# Patient Record
Sex: MALE | Race: WHITE | Employment: OTHER | URBAN - METROPOLITAN AREA
[De-identification: names, ages, dates, MRNs, and addresses within clinical notes are randomized per-mention and may not be internally consistent; named-entity substitution may affect disease eponyms.]

---

## 2017-01-27 ENCOUNTER — TELEPHONE (OUTPATIENT)
Dept: NEUROLOGY | Facility: CLINIC | Age: 62
End: 2017-01-27

## 2017-01-30 ENCOUNTER — TELEPHONE (OUTPATIENT)
Dept: NEUROLOGY | Facility: CLINIC | Age: 62
End: 2017-01-30

## 2017-01-30 ENCOUNTER — OFFICE VISIT (OUTPATIENT)
Dept: NEUROLOGY | Facility: CLINIC | Age: 62
End: 2017-01-30

## 2017-01-30 VITALS
SYSTOLIC BLOOD PRESSURE: 144 MMHG | WEIGHT: 228 LBS | HEART RATE: 80 BPM | BODY MASS INDEX: 32.64 KG/M2 | RESPIRATION RATE: 16 BRPM | HEIGHT: 70 IN | DIASTOLIC BLOOD PRESSURE: 80 MMHG

## 2017-01-30 DIAGNOSIS — S16.1XXA CERVICAL STRAIN, INITIAL ENCOUNTER: Primary | ICD-10-CM

## 2017-01-30 DIAGNOSIS — M54.12 CERVICAL RADICULOPATHY: ICD-10-CM

## 2017-01-30 PROCEDURE — 99204 OFFICE O/P NEW MOD 45 MIN: CPT | Performed by: OTHER

## 2017-01-30 RX ORDER — CYCLOBENZAPRINE HCL 10 MG
TABLET ORAL
Refills: 0 | COMMUNITY
Start: 2017-01-13 | End: 2021-09-15

## 2017-01-30 RX ORDER — HYDROCODONE BITARTRATE AND ACETAMINOPHEN 7.5; 325 MG/1; MG/1
TABLET ORAL
COMMUNITY
End: 2017-01-30 | Stop reason: DRUGHIGH

## 2017-01-30 RX ORDER — CLOPIDOGREL BISULFATE 75 MG/1
TABLET ORAL
COMMUNITY
Start: 2011-10-18 | End: 2017-01-30

## 2017-01-30 RX ORDER — LISINOPRIL 5 MG/1
TABLET ORAL
Refills: 1 | COMMUNITY
Start: 2016-11-25 | End: 2021-09-15

## 2017-01-30 RX ORDER — AMLODIPINE BESYLATE 5 MG/1
TABLET ORAL
Refills: 1 | COMMUNITY
Start: 2016-11-25 | End: 2021-09-15

## 2017-01-30 NOTE — TELEPHONE ENCOUNTER
Pt. informed insurance was verified and physical therapy is a covered benefit and does not require authorization. Can proceed with scheduling appt.
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
## 2017-02-17 ENCOUNTER — LAB ENCOUNTER (OUTPATIENT)
Dept: LAB | Facility: HOSPITAL | Age: 62
End: 2017-02-17
Attending: INTERNAL MEDICINE
Payer: MEDICARE

## 2017-02-17 DIAGNOSIS — I25.10 CAD (CORONARY ARTERY DISEASE): Primary | ICD-10-CM

## 2017-02-17 LAB
ANION GAP SERPL CALC-SCNC: 9 MMOL/L (ref 0–18)
BUN SERPL-MCNC: 12 MG/DL (ref 8–20)
BUN/CREAT SERPL: 17.4 (ref 10–20)
CALCIUM SERPL-MCNC: 9.2 MG/DL (ref 8.5–10.5)
CHLORIDE SERPL-SCNC: 101 MMOL/L (ref 95–110)
CO2 SERPL-SCNC: 27 MMOL/L (ref 22–32)
CREAT SERPL-MCNC: 0.69 MG/DL (ref 0.5–1.5)
GLUCOSE SERPL-MCNC: 116 MG/DL (ref 70–99)
OSMOLALITY UR CALC.SUM OF ELEC: 285 MOSM/KG (ref 275–295)
POTASSIUM SERPL-SCNC: 4.2 MMOL/L (ref 3.3–5.1)
SODIUM SERPL-SCNC: 137 MMOL/L (ref 136–144)

## 2017-02-17 PROCEDURE — 80048 BASIC METABOLIC PNL TOTAL CA: CPT

## 2017-02-17 PROCEDURE — 36415 COLL VENOUS BLD VENIPUNCTURE: CPT

## 2017-03-07 ENCOUNTER — OFFICE VISIT (OUTPATIENT)
Dept: NEUROLOGY | Facility: CLINIC | Age: 62
End: 2017-03-07

## 2017-03-07 VITALS
SYSTOLIC BLOOD PRESSURE: 136 MMHG | RESPIRATION RATE: 16 BRPM | DIASTOLIC BLOOD PRESSURE: 86 MMHG | WEIGHT: 225 LBS | HEART RATE: 84 BPM | BODY MASS INDEX: 32 KG/M2

## 2017-03-07 DIAGNOSIS — M54.12 CERVICAL RADICULOPATHY: Primary | ICD-10-CM

## 2017-03-07 PROCEDURE — 99214 OFFICE O/P EST MOD 30 MIN: CPT | Performed by: OTHER

## 2017-03-07 RX ORDER — ACETAMINOPHEN AND CODEINE PHOSPHATE 300; 30 MG/1; MG/1
TABLET ORAL
Refills: 0 | COMMUNITY
Start: 2017-02-10 | End: 2017-03-07

## 2017-03-07 RX ORDER — METHYLPREDNISOLONE 4 MG/1
TABLET ORAL
Qty: 1 PACKAGE | Refills: 0 | Status: ON HOLD | OUTPATIENT
Start: 2017-03-07 | End: 2021-01-03

## 2017-03-07 NOTE — PROGRESS NOTES
He relates that he completed the physical therapy with about 30% improvement. He is receiving trigger point injections. He scheduled for an EMG test of the right upper extremity.   Old right wrist injury and many year history of right carpal tunnel sympto

## 2017-03-08 ENCOUNTER — MED REC SCAN ONLY (OUTPATIENT)
Dept: NEUROLOGY | Facility: CLINIC | Age: 62
End: 2017-03-08

## 2017-03-29 ENCOUNTER — LAB ENCOUNTER (OUTPATIENT)
Dept: LAB | Facility: HOSPITAL | Age: 62
End: 2017-03-29
Attending: INTERNAL MEDICINE
Payer: MEDICARE

## 2017-03-29 DIAGNOSIS — I10 HYPERTENSION: Primary | ICD-10-CM

## 2017-03-29 LAB
ANION GAP SERPL CALC-SCNC: 9 MMOL/L (ref 0–18)
BUN SERPL-MCNC: 13 MG/DL (ref 8–20)
BUN/CREAT SERPL: 18.6 (ref 10–20)
CALCIUM SERPL-MCNC: 9.6 MG/DL (ref 8.5–10.5)
CHLORIDE SERPL-SCNC: 100 MMOL/L (ref 95–110)
CO2 SERPL-SCNC: 28 MMOL/L (ref 22–32)
CREAT SERPL-MCNC: 0.7 MG/DL (ref 0.5–1.5)
GLUCOSE SERPL-MCNC: 119 MG/DL (ref 70–99)
OSMOLALITY UR CALC.SUM OF ELEC: 285 MOSM/KG (ref 275–295)
POTASSIUM SERPL-SCNC: 5 MMOL/L (ref 3.3–5.1)
SODIUM SERPL-SCNC: 137 MMOL/L (ref 136–144)

## 2017-03-29 PROCEDURE — 36415 COLL VENOUS BLD VENIPUNCTURE: CPT

## 2017-03-29 PROCEDURE — 80048 BASIC METABOLIC PNL TOTAL CA: CPT

## 2021-01-01 ENCOUNTER — APPOINTMENT (OUTPATIENT)
Dept: GENERAL RADIOLOGY | Facility: HOSPITAL | Age: 66
DRG: 193 | End: 2021-01-01
Attending: EMERGENCY MEDICINE
Payer: MEDICARE

## 2021-01-01 ENCOUNTER — HOSPITAL ENCOUNTER (INPATIENT)
Facility: HOSPITAL | Age: 66
LOS: 2 days | Discharge: HOME | DRG: 193 | End: 2021-01-03
Attending: EMERGENCY MEDICINE | Admitting: HOSPITALIST
Payer: MEDICARE

## 2021-01-01 DIAGNOSIS — J18.9 COMMUNITY ACQUIRED PNEUMONIA OF RIGHT LUNG, UNSPECIFIED PART OF LUNG: Primary | ICD-10-CM

## 2021-01-01 PROBLEM — E87.3 METABOLIC ALKALOSIS: Status: ACTIVE | Noted: 2021-01-01

## 2021-01-01 PROBLEM — R73.9 HYPERGLYCEMIA: Status: ACTIVE | Noted: 2021-01-01

## 2021-01-01 LAB
ANION GAP SERPL CALC-SCNC: 2 MMOL/L (ref 0–18)
BACTERIA UR QL AUTO: NEGATIVE /HPF
BASE EXCESS BLD CALC-SCNC: 4.4 MMOL/L (ref ?–2)
BASOPHILS # BLD AUTO: 0.08 X10(3) UL (ref 0–0.2)
BASOPHILS NFR BLD AUTO: 0.7 %
BILIRUB UR QL: NEGATIVE
BUN BLD-MCNC: 13 MG/DL (ref 7–18)
BUN/CREAT SERPL: 15.3 (ref 10–20)
CALCIUM BLD-MCNC: 9.4 MG/DL (ref 8.5–10.1)
CHLORIDE SERPL-SCNC: 101 MMOL/L (ref 98–112)
CLARITY UR: CLEAR
CO2 SERPL-SCNC: 33 MMOL/L (ref 21–32)
COLOR UR: YELLOW
CREAT BLD-MCNC: 0.85 MG/DL
D DIMER PPP FEU-MCNC: 0.4 UG/ML FEU (ref ?–0.66)
DEPRECATED RDW RBC AUTO: 47.2 FL (ref 35.1–46.3)
EOSINOPHIL # BLD AUTO: 0.19 X10(3) UL (ref 0–0.7)
EOSINOPHIL NFR BLD AUTO: 1.8 %
ERYTHROCYTE [DISTWIDTH] IN BLOOD BY AUTOMATED COUNT: 12.6 % (ref 11–15)
GLUCOSE BLD-MCNC: 121 MG/DL (ref 70–99)
GLUCOSE UR-MCNC: NEGATIVE MG/DL
HCO3 BLDA-SCNC: 28.3 MEQ/L (ref 21–27)
HCT VFR BLD AUTO: 44.2 %
HGB BLD-MCNC: 13.9 G/DL
HGB UR QL STRIP.AUTO: NEGATIVE
HYALINE CASTS #/AREA URNS AUTO: 4 /LPF
IMM GRANULOCYTES # BLD AUTO: 0.06 X10(3) UL (ref 0–1)
IMM GRANULOCYTES NFR BLD: 0.6 %
KETONES UR-MCNC: NEGATIVE MG/DL
LEUKOCYTE ESTERASE UR QL STRIP.AUTO: NEGATIVE
LYMPHOCYTES # BLD AUTO: 2.21 X10(3) UL (ref 1–4)
LYMPHOCYTES NFR BLD AUTO: 20.5 %
MCH RBC QN AUTO: 32 PG (ref 26–34)
MCHC RBC AUTO-ENTMCNC: 31.4 G/DL (ref 31–37)
MCV RBC AUTO: 101.6 FL
MODIFIED ALLEN TEST: POSITIVE
MONOCYTES # BLD AUTO: 0.89 X10(3) UL (ref 0.1–1)
MONOCYTES NFR BLD AUTO: 8.3 %
NEUTROPHILS # BLD AUTO: 7.33 X10 (3) UL (ref 1.5–7.7)
NEUTROPHILS # BLD AUTO: 7.33 X10(3) UL (ref 1.5–7.7)
NEUTROPHILS NFR BLD AUTO: 68.1 %
NITRITE UR QL STRIP.AUTO: NEGATIVE
NT-PROBNP SERPL-MCNC: 622 PG/ML (ref ?–125)
O2 CT BLD-SCNC: 18.4 VOL% (ref 15–23)
O2/TOTAL GAS SETTING VFR VENT: 28 %
OSMOLALITY SERPL CALC.SUM OF ELEC: 283 MOSM/KG (ref 275–295)
OXYGEN LITERS/MINUTE: 2 L/MIN
PCO2 BLDA: 69 MM HG (ref 35–45)
PH BLDA: 7.29 [PH] (ref 7.35–7.45)
PH UR: 5 [PH] (ref 5–8)
PLATELET # BLD AUTO: 185 10(3)UL (ref 150–450)
PO2 BLDA: 102 MM HG (ref 80–100)
POTASSIUM SERPL-SCNC: 4.6 MMOL/L (ref 3.5–5.1)
PROCALCITONIN SERPL-MCNC: 0.03 NG/ML (ref ?–0.16)
PROT UR-MCNC: 30 MG/DL
PUNCTURE CHARGE: YES
RBC # BLD AUTO: 4.35 X10(6)UL
RBC #/AREA URNS AUTO: <1 /HPF
SAO2 % BLDA: >99 % (ref 94–100)
SARS-COV-2 RNA RESP QL NAA+PROBE: NOT DETECTED
SODIUM SERPL-SCNC: 136 MMOL/L (ref 136–145)
SP GR UR STRIP: 1.03 (ref 1–1.03)
TROPONIN I SERPL-MCNC: <0.045 NG/ML (ref ?–0.04)
UROBILINOGEN UR STRIP-ACNC: <2
WBC # BLD AUTO: 10.8 X10(3) UL (ref 4–11)
WBC #/AREA URNS AUTO: <1 /HPF

## 2021-01-01 PROCEDURE — 71045 X-RAY EXAM CHEST 1 VIEW: CPT | Performed by: EMERGENCY MEDICINE

## 2021-01-01 PROCEDURE — 99223 1ST HOSP IP/OBS HIGH 75: CPT | Performed by: HOSPITALIST

## 2021-01-01 RX ORDER — ISOSORBIDE MONONITRATE 60 MG/1
60 TABLET, EXTENDED RELEASE ORAL DAILY
COMMUNITY
End: 2021-09-15

## 2021-01-02 PROCEDURE — 99233 SBSQ HOSP IP/OBS HIGH 50: CPT | Performed by: HOSPITALIST

## 2021-01-02 RX ORDER — CLOPIDOGREL BISULFATE 75 MG/1
75 TABLET ORAL DAILY
Status: DISCONTINUED | OUTPATIENT
Start: 2021-01-02 | End: 2021-01-03

## 2021-01-02 RX ORDER — METHYLPREDNISOLONE SODIUM SUCCINATE 40 MG/ML
40 INJECTION, POWDER, LYOPHILIZED, FOR SOLUTION INTRAMUSCULAR; INTRAVENOUS EVERY 8 HOURS
Status: DISCONTINUED | OUTPATIENT
Start: 2021-01-02 | End: 2021-01-02

## 2021-01-02 RX ORDER — AZITHROMYCIN 250 MG/1
500 TABLET, FILM COATED ORAL NIGHTLY
Status: DISCONTINUED | OUTPATIENT
Start: 2021-01-02 | End: 2021-01-03

## 2021-01-02 RX ORDER — LISINOPRIL 5 MG/1
5 TABLET ORAL DAILY
Status: DISCONTINUED | OUTPATIENT
Start: 2021-01-02 | End: 2021-01-02

## 2021-01-02 RX ORDER — ZOLPIDEM TARTRATE 5 MG/1
5 TABLET ORAL NIGHTLY PRN
Status: DISCONTINUED | OUTPATIENT
Start: 2021-01-02 | End: 2021-01-03

## 2021-01-02 RX ORDER — ASPIRIN 81 MG/1
81 TABLET, CHEWABLE ORAL DAILY
Status: DISCONTINUED | OUTPATIENT
Start: 2021-01-02 | End: 2021-01-03

## 2021-01-02 RX ORDER — LABETALOL HYDROCHLORIDE 5 MG/ML
10 INJECTION, SOLUTION INTRAVENOUS EVERY 4 HOURS PRN
Status: DISCONTINUED | OUTPATIENT
Start: 2021-01-02 | End: 2021-01-03

## 2021-01-02 RX ORDER — AMLODIPINE BESYLATE 5 MG/1
5 TABLET ORAL DAILY
Status: DISCONTINUED | OUTPATIENT
Start: 2021-01-02 | End: 2021-01-03

## 2021-01-02 RX ORDER — METOPROLOL SUCCINATE 50 MG/1
50 TABLET, EXTENDED RELEASE ORAL 2 TIMES DAILY
Status: DISCONTINUED | OUTPATIENT
Start: 2021-01-02 | End: 2021-01-03

## 2021-01-02 RX ORDER — ATORVASTATIN CALCIUM 40 MG/1
40 TABLET, FILM COATED ORAL NIGHTLY
Status: DISCONTINUED | OUTPATIENT
Start: 2021-01-02 | End: 2021-01-03

## 2021-01-02 RX ORDER — CODEINE PHOSPHATE AND GUAIFENESIN 10; 100 MG/5ML; MG/5ML
5 SOLUTION ORAL EVERY 4 HOURS PRN
Status: DISCONTINUED | OUTPATIENT
Start: 2021-01-02 | End: 2021-01-03

## 2021-01-02 RX ORDER — ALBUTEROL SULFATE 90 UG/1
2 AEROSOL, METERED RESPIRATORY (INHALATION) EVERY 6 HOURS PRN
Status: DISCONTINUED | OUTPATIENT
Start: 2021-01-02 | End: 2021-01-03

## 2021-01-02 RX ORDER — ENALAPRILAT 2.5 MG/2ML
1.25 INJECTION INTRAVENOUS EVERY 6 HOURS PRN
Status: DISCONTINUED | OUTPATIENT
Start: 2021-01-02 | End: 2021-01-03

## 2021-01-02 RX ORDER — ONDANSETRON 2 MG/ML
4 INJECTION INTRAMUSCULAR; INTRAVENOUS EVERY 6 HOURS PRN
Status: DISCONTINUED | OUTPATIENT
Start: 2021-01-02 | End: 2021-01-03

## 2021-01-02 RX ORDER — ISOSORBIDE MONONITRATE 60 MG/1
60 TABLET, EXTENDED RELEASE ORAL DAILY
Status: DISCONTINUED | OUTPATIENT
Start: 2021-01-02 | End: 2021-01-03

## 2021-01-02 RX ORDER — PANTOPRAZOLE SODIUM 40 MG/1
40 TABLET, DELAYED RELEASE ORAL
Status: DISCONTINUED | OUTPATIENT
Start: 2021-01-02 | End: 2021-01-03

## 2021-01-02 RX ORDER — LISINOPRIL 20 MG/1
20 TABLET ORAL DAILY
Status: DISCONTINUED | OUTPATIENT
Start: 2021-01-03 | End: 2021-01-03

## 2021-01-02 RX ORDER — ACETAMINOPHEN 325 MG/1
650 TABLET ORAL EVERY 6 HOURS PRN
Status: DISCONTINUED | OUTPATIENT
Start: 2021-01-02 | End: 2021-01-03

## 2021-01-02 RX ORDER — HEPARIN SODIUM 5000 [USP'U]/ML
5000 INJECTION, SOLUTION INTRAVENOUS; SUBCUTANEOUS EVERY 12 HOURS SCHEDULED
Status: DISCONTINUED | OUTPATIENT
Start: 2021-01-02 | End: 2021-01-03

## 2021-01-02 RX ORDER — LORATADINE 10 MG/1
10 TABLET ORAL DAILY
COMMUNITY
End: 2021-09-15

## 2021-01-02 RX ORDER — IPRATROPIUM BROMIDE AND ALBUTEROL SULFATE 2.5; .5 MG/3ML; MG/3ML
3 SOLUTION RESPIRATORY (INHALATION)
Status: DISCONTINUED | OUTPATIENT
Start: 2021-01-02 | End: 2021-01-03

## 2021-01-02 NOTE — ED NOTES
Pt's wife states she believes pt is having reaction to new medication Isosorbide that was started a week ago.

## 2021-01-02 NOTE — ED NOTES
Orders for admission, patient is aware of plan and ready to go upstairs. Any questions, please call ED KEM Saucedo  at extension [phone].    Type of COVID test sent:Rapid  COVID Suspicion level: Low    Titratable drug(s) infusing:Azithromycin  Rate:250ml/hr
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
## 2021-01-02 NOTE — H&P
Rekha Silva [id number] Patient Status:  Emergency    1955 MRN [id number]   Location [address] Attending Ritta Dakins, MD   [facility] Day # 0 PCP Yang Joseph MD     Da Patient Reported? Taking? Albuterol Sulfate  (90 BASE) MCG/ACT Inhalation Aero Soln Taking  Yes Yes   Sig: Inhale into the lungs every 6 (six) hours as needed for Wheezing.    AmLODIPine Besylate 5 MG Oral Tab Not Taking  Yes No   Sig: TK 1 T PO  Q Normocephalic, oral mucosa is moist.  Head:  Normocephalic, atraumatic. Neck:  Supple, non-tender, no carotid bruit, no jugular venous distention, no lymphadenopathy, no thyromegaly.   Respiratory: Occasional wheeze, poor air entry, respirations are mildly every 8 hours. Acute respiratory acidosis  Secondary to COPD exacerbation, currently on BiPAP will monitor clinically.     Acute encephalopathy  Likely secondary to hypercarbia, showing signs of improvement with BiPAP in place we will continue to monitor

## 2021-01-02 NOTE — PLAN OF CARE
Problem: Patient Centered Care  Goal: Patient preferences are identified and integrated in the patient's plan of care  Description: Interventions:  - What would you like us to know as we care for you?  PT FROM HOME WITH WIFE- Provide timely, complete, and and behaviors that affect risk of falls.   - [place] fall precautions as indicated by assessment.  - Educate pt/family on patient safety including physical limitations  - Instruct pt to call for assistance with activity based on assessment  - Modify envir weights  Outcome: Progressing  Goal: Absence of cardiac arrhythmias or at baseline  Description: INTERVENTIONS:  - Continuous cardiac monitoring, monitor vital signs, obtain 12 lead EKG if indicated  - Evaluate effectiveness of antiarrhythmic and heart rat

## 2021-01-02 NOTE — ED NOTES
Pt A&Ox3, reg resp, nard. Pt reports sob x2 days, reports cp states it is chronic. Pt reports pmhx cabg, htn and hld.  Pt 84% on roomair, initiated on 2 L NC. Pt's wife at bedside, reports pt has been confused since this am, states \"hes talking to himself\

## 2021-01-02 NOTE — PROGRESS NOTES
[facility]    Progress Note    Gladis Mcnulty Sr. Patient Status:  Inpatient    1955 MRN [id number]   Location [facility] 5SW/SE Attending Kirit Reeves MD   Hosp Day # 1 PCP No primary care provider on file.        Subjective: Nebulization Q6H [address]   • MethylPREDNISolone Sodium Succ  40 mg Intravenous Q8H   • azithromycin  500 mg Oral Nightly   • Pantoprazole Sodium  40 mg Oral QAM AC   • Fluticasone Furoate-Vilanterol  1 puff Inhalation Daily       Current PRN Inpatient Meds: changes have occurred Electronically signed on 01/01/2021 at 22:17 by Saige Form Assessment and Plan:   Vale Arzola is a 70yo male w/ hx sig for CAD s/p CABG 2012, HTN, COPD, and LINDA noncompliant with CPAP, who p/w dyspnea for 2 days.  Patient zeynep

## 2021-01-02 NOTE — ED PROVIDER NOTES
Patient Seen in: [facility] AND [facility] Emergency Department      History   Patient presents with:  Shortness Of Breath    Stated Complaint: sob    HPI/Subjective:   HPI  Patient is a 48-year-old male history of CAD status post CABG, MI, hypertension, hyperli Current:/78   Pulse 93   Temp 98.1 °F (36.7 °C) (Oral)   Resp 16   Ht 177.8 cm (5' 10\")   Wt 95.3 kg   SpO2 96%   BMI 30.13 kg/m²         Physical Exam  Vitals signs and nursing note reviewed.    Constitutional:       General: He is not in acut following components:    Protein Urine 30  (*)     All other components within normal limits   ARTERIAL BLOOD GAS - Abnormal; Notable for the following components:    ABG pH 7.29 (*)     ABG pCO2 69 (*)     ABG PO2 102 (*)     ABG HCO3 28.3 (*)     Blood G no acute distress. Vital signs with tachypnea and hypoxia. Oxygen saturation 84% on room air. Patient placed on 2 L nasal cannula with improvement in oxygen saturation. Exam with conversational dyspnea, lungs clear without wheezing.   No increased work

## 2021-01-03 VITALS
SYSTOLIC BLOOD PRESSURE: 139 MMHG | OXYGEN SATURATION: 93 % | HEIGHT: 70 IN | TEMPERATURE: 98 F | HEART RATE: 86 BPM | BODY MASS INDEX: 30.06 KG/M2 | DIASTOLIC BLOOD PRESSURE: 92 MMHG | RESPIRATION RATE: 20 BRPM | WEIGHT: 210 LBS

## 2021-01-03 PROBLEM — R73.9 HYPERGLYCEMIA: Status: RESOLVED | Noted: 2021-01-01 | Resolved: 2021-01-03

## 2021-01-03 PROBLEM — J18.9 COMMUNITY ACQUIRED PNEUMONIA OF RIGHT LUNG, UNSPECIFIED PART OF LUNG: Status: RESOLVED | Noted: 2021-01-01 | Resolved: 2021-01-03

## 2021-01-03 PROBLEM — J18.9 COMMUNITY ACQUIRED PNEUMONIA OF RIGHT LUNG: Status: RESOLVED | Noted: 2021-01-01 | Resolved: 2021-01-03

## 2021-01-03 PROBLEM — E87.3 METABOLIC ALKALOSIS: Status: RESOLVED | Noted: 2021-01-01 | Resolved: 2021-01-03

## 2021-01-03 LAB
ANION GAP SERPL CALC-SCNC: 1 MMOL/L (ref 0–18)
BUN BLD-MCNC: 10 MG/DL (ref 7–18)
BUN/CREAT SERPL: 13.7 (ref 10–20)
CALCIUM BLD-MCNC: 9.4 MG/DL (ref 8.5–10.1)
CHLORIDE SERPL-SCNC: 102 MMOL/L (ref 98–112)
CO2 SERPL-SCNC: 37 MMOL/L (ref 21–32)
CREAT BLD-MCNC: 0.73 MG/DL
DEPRECATED RDW RBC AUTO: 46.4 FL (ref 35.1–46.3)
ERYTHROCYTE [DISTWIDTH] IN BLOOD BY AUTOMATED COUNT: 12.4 % (ref 11–15)
GLUCOSE BLD-MCNC: 121 MG/DL (ref 70–99)
HCT VFR BLD AUTO: 39.9 %
HGB BLD-MCNC: 12.8 G/DL
MCH RBC QN AUTO: 32.2 PG (ref 26–34)
MCHC RBC AUTO-ENTMCNC: 32.1 G/DL (ref 31–37)
MCV RBC AUTO: 100.5 FL
OSMOLALITY SERPL CALC.SUM OF ELEC: 290 MOSM/KG (ref 275–295)
PLATELET # BLD AUTO: 159 10(3)UL (ref 150–450)
POTASSIUM SERPL-SCNC: 3.8 MMOL/L (ref 3.5–5.1)
RBC # BLD AUTO: 3.97 X10(6)UL
SODIUM SERPL-SCNC: 140 MMOL/L (ref 136–145)
WBC # BLD AUTO: 7.9 X10(3) UL (ref 4–11)

## 2021-01-03 PROCEDURE — 99239 HOSP IP/OBS DSCHRG MGMT >30: CPT | Performed by: HOSPITALIST

## 2021-01-03 RX ORDER — IPRATROPIUM BROMIDE AND ALBUTEROL SULFATE 2.5; .5 MG/3ML; MG/3ML
3 SOLUTION RESPIRATORY (INHALATION) EVERY 6 HOURS PRN
Qty: 300 ML | Refills: 0 | Status: SHIPPED | OUTPATIENT
Start: 2021-01-03

## 2021-01-03 RX ORDER — CEFUROXIME AXETIL 500 MG/1
500 TABLET ORAL 2 TIMES DAILY
Qty: 6 TABLET | Refills: 0 | Status: SHIPPED | OUTPATIENT
Start: 2021-01-03 | End: 2021-01-06

## 2021-01-03 RX ORDER — POTASSIUM CHLORIDE 20 MEQ/1
40 TABLET, EXTENDED RELEASE ORAL ONCE
Status: COMPLETED | OUTPATIENT
Start: 2021-01-03 | End: 2021-01-03

## 2021-01-03 RX ORDER — AZITHROMYCIN 250 MG/1
250 TABLET, FILM COATED ORAL DAILY
Qty: 3 TABLET | Refills: 0 | Status: SHIPPED | OUTPATIENT
Start: 2021-01-03 | End: 2021-01-06

## 2021-01-03 NOTE — PROGRESS NOTES
Discharge summary: Went over discharge instructions with patient and answered all questions. IV taken off. Tele discontinued and hub notified. Patient has all belongings. Paper scripts for meds given to patient. Waiting for his wife to pick him up.

## 2021-01-03 NOTE — DISCHARGE SUMMARY
[facility]    Discharge Summary    Yobany García Sr. Patient Status:  Inpatient    1955 MRN [id number]   Location [facility] 5SW/SE Attending Yary Chowdhury MD   Hosp Day # 2 PCP No primary care provider on file.      Date of Ad discharge.     # Metabolic encephalopathy d/t hypercapnia  - resolved     # Community acquired pneumonia of right lung, possbile  - CXR on admission shows subtle patchy opacification in the right lung  - Ceftriaxone and azithromycin (1/1-1/2) -> cefuroxime 01/03/2021    HCT 39.9 01/03/2021    .0 01/03/2021    CREATSERUM 0.73 01/03/2021    BUN 10 01/03/2021     01/03/2021    K 3.8 01/03/2021     01/03/2021    CO2 37.0 (H) 01/03/2021     (H) 01/03/2021    CA 9.4 01/03/2021    ALB 3.8 Tabs  Commonly known as: NORVASC      TK 1 T PO  QD   Refills: 1     aspirin 81 MG Tabs      Take 81 mg by mouth daily. Refills: 0     atorvastatin 40 MG Tabs  Commonly known as: LIPITOR      Take 40 mg by mouth nightly.    Refills: 0     Clopidogrel Bisu

## 2021-01-03 NOTE — PLAN OF CARE
Pt alert and oriented, placed on cont pulse ox overnight, maintains room air and continues to refuse cpap/bipap. Tylenol given for pain. Pt sleeping soundly throughout the night. Voiding freely to urinal. Denies dizziness or nausea.  Abx of PO azithromycin and evaluate response  - Implement non-pharmacological measures as appropriate and evaluate response  - Consider cultural and social influences on pain and pain management  - Manage/alleviate anxiety  - Utilize distraction and/or relaxation techniques  - M support system  Outcome: Progressing     Problem: CARDIOVASCULAR - ADULT  Goal: Maintains optimal cardiac output and hemodynamic stability  Description: INTERVENTIONS:  - Monitor vital signs, rhythm, and trends  - Monitor for bleeding, hypotension and sign

## 2021-08-28 ENCOUNTER — APPOINTMENT (OUTPATIENT)
Dept: CT IMAGING | Facility: HOSPITAL | Age: 66
DRG: 097 | End: 2021-08-28
Payer: MEDICARE

## 2021-08-28 ENCOUNTER — APPOINTMENT (OUTPATIENT)
Dept: GENERAL RADIOLOGY | Facility: HOSPITAL | Age: 66
DRG: 097 | End: 2021-08-28
Payer: MEDICARE

## 2021-08-28 ENCOUNTER — HOSPITAL ENCOUNTER (INPATIENT)
Facility: HOSPITAL | Age: 66
LOS: 18 days | Discharge: NURSING, REHAB OR LONG-TERM CARE | DRG: 097 | End: 2021-09-15
Admitting: INTERNAL MEDICINE
Payer: MEDICARE

## 2021-08-28 DIAGNOSIS — F03.90 RAPIDLY PROGRESSIVE DEMENTIA (HCC): ICD-10-CM

## 2021-08-28 DIAGNOSIS — J96.22 ACUTE ON CHRONIC RESPIRATORY FAILURE WITH HYPERCAPNIA (HCC): Primary | ICD-10-CM

## 2021-08-28 DIAGNOSIS — G93.41 METABOLIC ENCEPHALOPATHY: ICD-10-CM

## 2021-08-28 DIAGNOSIS — R41.0 DELIRIUM, ACUTE: ICD-10-CM

## 2021-08-28 DIAGNOSIS — R27.8 OTHER LACK OF COORDINATION: ICD-10-CM

## 2021-08-28 PROBLEM — E87.2 RESPIRATORY ACIDOSIS: Status: ACTIVE | Noted: 2021-08-28

## 2021-08-28 LAB
AMPHET UR QL SCN: NEGATIVE
ANION GAP SERPL CALC-SCNC: 0 MMOL/L (ref 0–18)
BARBITURATES UR QL SCN: NEGATIVE
BASE EXCESS BLD CALC-SCNC: 8 MMOL/L (ref ?–2)
BASOPHILS # BLD AUTO: 0.06 X10(3) UL (ref 0–0.2)
BASOPHILS NFR BLD AUTO: 0.7 %
BILIRUB UR QL: NEGATIVE
BUN BLD-MCNC: 16 MG/DL (ref 7–18)
BUN/CREAT SERPL: 18.4 (ref 10–20)
CALCIUM BLD-MCNC: 9.8 MG/DL (ref 8.5–10.1)
CANNABINOIDS UR QL SCN: NEGATIVE
CHLORIDE SERPL-SCNC: 104 MMOL/L (ref 98–112)
CO2 SERPL-SCNC: 37 MMOL/L (ref 21–32)
COCAINE UR QL: NEGATIVE
COLOR UR: YELLOW
CREAT BLD-MCNC: 0.87 MG/DL
CREAT UR-SCNC: 512 MG/DL
DEPRECATED RDW RBC AUTO: 48.7 FL (ref 35.1–46.3)
EOSINOPHIL # BLD AUTO: 0.2 X10(3) UL (ref 0–0.7)
EOSINOPHIL NFR BLD AUTO: 2.3 %
ERYTHROCYTE [DISTWIDTH] IN BLOOD BY AUTOMATED COUNT: 13 % (ref 11–15)
GLUCOSE BLD-MCNC: 108 MG/DL (ref 70–99)
GLUCOSE BLDC GLUCOMTR-MCNC: 118 MG/DL (ref 70–99)
GLUCOSE UR-MCNC: NEGATIVE MG/DL
HCO3 BLDA-SCNC: 31.1 MEQ/L (ref 21–27)
HCT VFR BLD AUTO: 47.1 %
HGB BLD-MCNC: 14.9 G/DL
HGB UR QL STRIP.AUTO: NEGATIVE
IMM GRANULOCYTES # BLD AUTO: 0.05 X10(3) UL (ref 0–1)
IMM GRANULOCYTES NFR BLD: 0.6 %
KETONES UR-MCNC: NEGATIVE MG/DL
LEUKOCYTE ESTERASE UR QL STRIP.AUTO: NEGATIVE
LYMPHOCYTES # BLD AUTO: 2.47 X10(3) UL (ref 1–4)
LYMPHOCYTES NFR BLD AUTO: 28.6 %
MCH RBC QN AUTO: 32.3 PG (ref 26–34)
MCHC RBC AUTO-ENTMCNC: 31.6 G/DL (ref 31–37)
MCV RBC AUTO: 102.2 FL
MDMA UR QL SCN: NEGATIVE
METHADONE UR QL SCN: NEGATIVE
MODIFIED ALLEN TEST: POSITIVE
MONOCYTES # BLD AUTO: 1.21 X10(3) UL (ref 0.1–1)
MONOCYTES NFR BLD AUTO: 14 %
NEUTROPHILS # BLD AUTO: 4.66 X10 (3) UL (ref 1.5–7.7)
NEUTROPHILS # BLD AUTO: 4.66 X10(3) UL (ref 1.5–7.7)
NEUTROPHILS NFR BLD AUTO: 53.8 %
NITRITE UR QL STRIP.AUTO: NEGATIVE
O2 CT BLD-SCNC: 20.7 VOL% (ref 15–23)
OSMOLALITY SERPL CALC.SUM OF ELEC: 294 MOSM/KG (ref 275–295)
OXYCODONE UR QL SCN: NEGATIVE
OXYGEN LITERS/MINUTE: 3 L/MIN
PCO2 BLDA: 90 MM HG (ref 35–45)
PCP UR QL SCN: NEGATIVE
PH BLDA: 7.25 [PH] (ref 7.35–7.45)
PH UR: 5 [PH] (ref 5–8)
PLATELET # BLD AUTO: 180 10(3)UL (ref 150–450)
PO2 BLDA: 111 MM HG (ref 80–100)
POTASSIUM SERPL-SCNC: 4.2 MMOL/L (ref 3.5–5.1)
PROT UR-MCNC: 100 MG/DL
PUNCTURE CHARGE: YES
RBC # BLD AUTO: 4.61 X10(6)UL
SAO2 % BLDA: 98.9 % (ref 94–100)
SARS-COV-2 RNA RESP QL NAA+PROBE: NOT DETECTED
SODIUM SERPL-SCNC: 141 MMOL/L (ref 136–145)
SP GR UR STRIP: >1.03 (ref 1–1.03)
TROPONIN I SERPL-MCNC: <0.045 NG/ML (ref ?–0.04)
UROBILINOGEN UR STRIP-ACNC: <2
WBC # BLD AUTO: 8.7 X10(3) UL (ref 4–11)

## 2021-08-28 PROCEDURE — 71045 X-RAY EXAM CHEST 1 VIEW: CPT

## 2021-08-28 PROCEDURE — 99291 CRITICAL CARE FIRST HOUR: CPT | Performed by: INTERNAL MEDICINE

## 2021-08-28 PROCEDURE — 70450 CT HEAD/BRAIN W/O DYE: CPT

## 2021-08-28 RX ORDER — IPRATROPIUM BROMIDE AND ALBUTEROL SULFATE 2.5; .5 MG/3ML; MG/3ML
3 SOLUTION RESPIRATORY (INHALATION)
Status: DISCONTINUED | OUTPATIENT
Start: 2021-08-28 | End: 2021-08-30

## 2021-08-28 RX ORDER — LISINOPRIL 5 MG/1
5 TABLET ORAL DAILY
Status: DISCONTINUED | OUTPATIENT
Start: 2021-08-29 | End: 2021-09-08

## 2021-08-28 RX ORDER — HEPARIN SODIUM 5000 [USP'U]/ML
5000 INJECTION, SOLUTION INTRAVENOUS; SUBCUTANEOUS EVERY 12 HOURS SCHEDULED
Status: DISCONTINUED | OUTPATIENT
Start: 2021-08-28 | End: 2021-09-15

## 2021-08-28 RX ORDER — ISOSORBIDE MONONITRATE 60 MG/1
60 TABLET, EXTENDED RELEASE ORAL DAILY
Status: DISCONTINUED | OUTPATIENT
Start: 2021-08-29 | End: 2021-09-15

## 2021-08-28 RX ORDER — METOPROLOL SUCCINATE 50 MG/1
50 TABLET, EXTENDED RELEASE ORAL 2 TIMES DAILY
Status: DISCONTINUED | OUTPATIENT
Start: 2021-08-28 | End: 2021-09-15

## 2021-08-28 RX ORDER — SODIUM CHLORIDE 9 MG/ML
INJECTION, SOLUTION INTRAVENOUS CONTINUOUS
Status: DISCONTINUED | OUTPATIENT
Start: 2021-08-28 | End: 2021-08-30

## 2021-08-28 RX ORDER — CLOPIDOGREL BISULFATE 75 MG/1
75 TABLET ORAL DAILY
Status: DISCONTINUED | OUTPATIENT
Start: 2021-08-29 | End: 2021-09-15

## 2021-08-28 RX ORDER — ASPIRIN 81 MG/1
81 TABLET, CHEWABLE ORAL DAILY
Status: DISCONTINUED | OUTPATIENT
Start: 2021-08-29 | End: 2021-09-15

## 2021-08-28 RX ORDER — IPRATROPIUM BROMIDE AND ALBUTEROL SULFATE 2.5; .5 MG/3ML; MG/3ML
SOLUTION RESPIRATORY (INHALATION)
Status: DISPENSED
Start: 2021-08-28 | End: 2021-08-29

## 2021-08-28 RX ORDER — ACETAMINOPHEN 10 MG/ML
1000 INJECTION, SOLUTION INTRAVENOUS ONCE
Status: COMPLETED | OUTPATIENT
Start: 2021-08-28 | End: 2021-08-28

## 2021-08-28 RX ORDER — ATORVASTATIN CALCIUM 40 MG/1
40 TABLET, FILM COATED ORAL NIGHTLY
Status: DISCONTINUED | OUTPATIENT
Start: 2021-08-29 | End: 2021-09-15

## 2021-08-28 RX ORDER — ACETAMINOPHEN 325 MG/1
650 TABLET ORAL EVERY 6 HOURS PRN
Status: DISCONTINUED | OUTPATIENT
Start: 2021-08-28 | End: 2021-09-13

## 2021-08-28 RX ORDER — AMLODIPINE BESYLATE 5 MG/1
5 TABLET ORAL DAILY
Status: DISCONTINUED | OUTPATIENT
Start: 2021-08-29 | End: 2021-09-08

## 2021-08-28 RX ORDER — METHYLPREDNISOLONE SODIUM SUCCINATE 40 MG/ML
40 INJECTION, POWDER, LYOPHILIZED, FOR SOLUTION INTRAMUSCULAR; INTRAVENOUS EVERY 6 HOURS
Status: DISCONTINUED | OUTPATIENT
Start: 2021-08-28 | End: 2021-08-30

## 2021-08-28 NOTE — ED QUICK NOTES
Patient presents with:  Altered Mental Status    Patient to ed via private vehicle by wife, per wife patient has been altered x 1 week. Hx of copd denies fever. Denies urinary sx. Patient arrives to ed extremely restless.  Blood drawn and sent to lab in

## 2021-08-28 NOTE — ED QUICK NOTES
Rounding completed    Awaiting lab/imaging results  Elimination assistance offered  No additional needs at this time  Call light within reach, will update patient with more information  Will continue to monitor

## 2021-08-28 NOTE — ED PROVIDER NOTES
Patient Seen in: [facility] AND [facility] Emergency Department      History   Patient presents with:  Altered Mental Status    Stated Complaint:     HPI/Subjective:   HPI    The patient is a 79-year-old male with a history of coronary artery disease, sleep apne All other systems reviewed and negative except as noted above. Physical Exam     ED Triage Vitals   BP 08/28/21 1312 128/67   Pulse 08/28/21 1312 63   Resp 08/28/21 1312 17   Temp 08/28/21 1312 96.8 °F (36 °C)   Temp src --    SpO2 08/28/21 1312 (! ) Cranial nerves are intact. Motor: Motor function is intact. Coordination: Coordination abnormal.      Deep Tendon Reflexes: Reflexes are normal and symmetric.       Comments: Confused, moving all extremities, intermittently follows commands, answe CBC W/ DIFFERENTIAL[[id number]]          Abnormal            Final result                 Please view results for these tests on the individual orders.    RAINBOW DRAW LAVENDER   RAINBOW DRAW LIGHT GREEN   RAINBOW DRAW GOLD     EKG    Rate, intervals and a Impression:  Acute on chronic respiratory failure with hypercapnia (HCC)  (primary encounter diagnosis)  Delirium, acute  Metabolic encephalopathy     Disposition:  Admit  8/28/2021  3:18 pm    Follow-up:  No follow-up provider specified.   We recommend guerline

## 2021-08-28 NOTE — ED INITIAL ASSESSMENT (HPI)
Pt presents via triage with wife who reports pt has been acting \"off\" since Monday. Wife states he has been falling asleep at the table, lethargic, weak to stand, c/o intermittent chest pain, confused and not answering questions appropriately.  Pt present

## 2021-08-28 NOTE — H&P
[facility]    History & Physical    Date:  8/28/2021   Date of Admission:  8/28/2021      Chief Complaint:   Duyen Bush Sr. is a(n) 78year old male with lethargy and hypercapnia.     HPI:   The patient has an extensive prior history of t Use      Smoking status: Former Smoker        Types: Cigarettes        Quit date: 2016        Years since quittin.0      Smokeless tobacco: Never Used    Alcohol use: Yes      Alcohol/week: 0.0 standard drinks      Comment: 6 cans beer per week. 111    Assessment/Plan:   1. Acute on chronic respiratory failure–the patient has multifactorial respiratory failure with COPD, obesity–hypoventilation and untreated obstructive sleep apnea constituting the overlap syndrome.   Also, he is taking narcotics

## 2021-08-29 LAB
ALBUMIN SERPL-MCNC: 3.5 G/DL (ref 3.4–5)
ALBUMIN/GLOB SERPL: 1 {RATIO} (ref 1–2)
ALP LIVER SERPL-CCNC: 59 U/L
ALT SERPL-CCNC: 20 U/L
ANION GAP SERPL CALC-SCNC: 0 MMOL/L (ref 0–18)
AST SERPL-CCNC: 14 U/L (ref 15–37)
BASE EXCESS BLD CALC-SCNC: 8.4 MMOL/L (ref ?–2)
BASOPHILS # BLD AUTO: 0.01 X10(3) UL (ref 0–0.2)
BASOPHILS NFR BLD AUTO: 0.2 %
BILIRUB SERPL-MCNC: 0.4 MG/DL (ref 0.1–2)
BUN BLD-MCNC: 14 MG/DL (ref 7–18)
BUN/CREAT SERPL: 25.9 (ref 10–20)
CALCIUM BLD-MCNC: 9 MG/DL (ref 8.5–10.1)
CHLORIDE SERPL-SCNC: 106 MMOL/L (ref 98–112)
CO2 SERPL-SCNC: 33 MMOL/L (ref 21–32)
CREAT BLD-MCNC: 0.54 MG/DL
DEPRECATED RDW RBC AUTO: 48.6 FL (ref 35.1–46.3)
EOSINOPHIL # BLD AUTO: 0 X10(3) UL (ref 0–0.7)
EOSINOPHIL NFR BLD AUTO: 0 %
ERYTHROCYTE [DISTWIDTH] IN BLOOD BY AUTOMATED COUNT: 12.9 % (ref 11–15)
GLOBULIN PLAS-MCNC: 3.5 G/DL (ref 2.8–4.4)
GLUCOSE BLD-MCNC: 138 MG/DL (ref 70–99)
HCO3 BLDA-SCNC: 31.5 MEQ/L (ref 21–27)
HCT VFR BLD AUTO: 44.4 %
HGB BLD-MCNC: 14.1 G/DL
IMM GRANULOCYTES # BLD AUTO: 0.02 X10(3) UL (ref 0–1)
IMM GRANULOCYTES NFR BLD: 0.4 %
LYMPHOCYTES # BLD AUTO: 0.67 X10(3) UL (ref 1–4)
LYMPHOCYTES NFR BLD AUTO: 14.8 %
M PROTEIN MFR SERPL ELPH: 7 G/DL (ref 6.4–8.2)
MCH RBC QN AUTO: 32.6 PG (ref 26–34)
MCHC RBC AUTO-ENTMCNC: 31.8 G/DL (ref 31–37)
MCV RBC AUTO: 102.8 FL
MODIFIED ALLEN TEST: POSITIVE
MONOCYTES # BLD AUTO: 0.06 X10(3) UL (ref 0.1–1)
MONOCYTES NFR BLD AUTO: 1.3 %
NEUTROPHILS # BLD AUTO: 3.77 X10 (3) UL (ref 1.5–7.7)
NEUTROPHILS # BLD AUTO: 3.77 X10(3) UL (ref 1.5–7.7)
NEUTROPHILS NFR BLD AUTO: 83.3 %
O2 CT BLD-SCNC: 18.9 VOL% (ref 15–23)
OSMOLALITY SERPL CALC.SUM OF ELEC: 291 MOSM/KG (ref 275–295)
OXYGEN LITERS/MINUTE: 2 L/MIN
PCO2 BLDA: 55 MM HG (ref 35–45)
PH BLDA: 7.41 [PH] (ref 7.35–7.45)
PLATELET # BLD AUTO: 160 10(3)UL (ref 150–450)
PO2 BLDA: 64 MM HG (ref 80–100)
POTASSIUM SERPL-SCNC: 4.3 MMOL/L (ref 3.5–5.1)
PUNCTURE CHARGE: YES
RBC # BLD AUTO: 4.32 X10(6)UL
SAO2 % BLDA: >99 % (ref 94–100)
SODIUM SERPL-SCNC: 139 MMOL/L (ref 136–145)
WBC # BLD AUTO: 4.5 X10(3) UL (ref 4–11)

## 2021-08-29 PROCEDURE — 99233 SBSQ HOSP IP/OBS HIGH 50: CPT | Performed by: INTERNAL MEDICINE

## 2021-08-29 RX ORDER — HYDROCODONE BITARTRATE AND ACETAMINOPHEN 5; 325 MG/1; MG/1
1 TABLET ORAL EVERY 6 HOURS PRN
Status: DISCONTINUED | OUTPATIENT
Start: 2021-08-29 | End: 2021-08-31

## 2021-08-29 NOTE — RESPIRATORY THERAPY NOTE
Received patient on cont Bipap 12/5 40%. Tolerating well. Saturating appropriately.  No changes at this time

## 2021-08-29 NOTE — PROGRESS NOTES
Received pt from ED at approx. 1645. On continuous BiPAP, restless and oriented to self only. Wife at bedside. Unable to complete med rec d/t wife unsure of home meds and pharmacy closed.  Notified MD.

## 2021-08-29 NOTE — PHYSICAL THERAPY NOTE
PHYSICAL THERAPY EVALUATION - INPATIENT     Room Number: 412/248-W  Evaluation Date: 8/29/2021  Type of Evaluation: Initial   Physician Order: PT Eval and Treat    Presenting Problem: respiratory failure   Reason for Therapy: Mobility Dysfunction and Disc of patient status post session. Patient will benefit from continued IP PT services to address these deficits in preparation for discharge.     DISCHARGE RECOMMENDATIONS  PT Discharge Recommendations: 24 hour care/supervision;Home with home health PT    MIREYA AND STRENGTH ASSESSMENT    Lower extremity ROM is within functional limits BLE WNL    Lower extremity strength is within functional limits BLE WNL    BALANCE  Static Sitting: Good  Dynamic Sitting: Fair +  Static Standing: Fair  Dynamic Standing: Fair    A independence with home activity/exercise instructions provided to patient in preparation for discharge.    Goal #5   Current Status    Goal #6    Goal #6  Current Status

## 2021-08-29 NOTE — PLAN OF CARE
Problem: Delirium  Goal: Minimize duration of delirium  Description: Interventions:  - Encourage use of hearing aids, eye glasses  - Promote highest level of mobility daily  - Provide frequent reorientation  - Promote wakefulness i.e. lights on, blinds o within normal limits  Description: INTERVENTIONS:  - Monitor labs and rhythm and assess patient for signs and symptoms of electrolyte imbalances  - Administer electrolyte replacement as ordered  - Monitor response to electrolyte replacements, including rhy

## 2021-08-29 NOTE — OCCUPATIONAL THERAPY NOTE
OCCUPATIONAL THERAPY EVALUATION - INPATIENT     Room Number: 076/255-C  Evaluation Date: 8/29/2021  Type of Evaluation: Initial  Presenting Problem:  (acute on chronic respiratory failure)    Physician Order: IP Consult to Occupational Therapy  Reason for conservation/work simplification techniques;ADL training;Functional transfer training; Endurance training;Patient/Family education;Patient/Family training;Equipment eval/education       OCCUPATIONAL THERAPY MEDICAL/SOCIAL HISTORY     Problem List  Principal functional limits    ACTIVITIES OF DAILY LIVING ASSESSMENT  AM-PAC ‘6-Clicks’ Inpatient Daily Activity Short Form  How much help from another person does the patient currently need…  -   Putting on and taking off regular lower body clothing?: A Little  -

## 2021-08-29 NOTE — PROGRESS NOTES
[facility]    Progress Note    Ruben Kaye Sr. Patient Status:  Inpatient    1955 MRN [id number]   Location [facility] 2W/SW Attending Zainab Max MD   Hosp Day # 1 PCP PHYSICIAN NONSTAFF     CC: Shortness of breath 102.2* 102.8*   MCH 32.3 32.6   MCHC 31.6 31.8   RDW 13.0 12.9   NEPRELIM 4.66 3.77   WBC 8.7 4.5   .0 160.0     Recent Labs   Lab 08/28/21  1328 08/29/21  0513   * 138*   BUN 16 14   CREATSERUM 0.87 0.54*   GFRAA 103 126   GFRNAA 89 109   CA and concern for possible LINDA.   -Patient also with history of heart failure with reduced ejection fraction, but without signs of hypervolemia.  -Continue duo nebs, Solu-Medrol  -BiPAP nightly  -Recheck blood gas to monitor hypercapnia  -Pulmonology on consu

## 2021-08-29 NOTE — PLAN OF CARE
Problem: Delirium  Goal: Minimize duration of delirium  Description: Interventions:  - Encourage use of hearing aids, eye glasses  - Promote highest level of mobility daily  - Provide frequent reorientation  - Promote wakefulness i.e. lights on, blinds o electrolyte replacements, including rhythm and repeat lab results as appropriate  - Fluid restriction as ordered  - Instruct patient on fluid and nutrition restrictions as appropriate  Outcome: Progressing     Problem: SKIN/TISSUE INTEGRITY - ADULT  Goal:

## 2021-08-29 NOTE — SLP NOTE
ADULT SWALLOWING EVALUATION    ASSESSMENT    ASSESSMENT/OVERALL IMPRESSION:  PPE: DROPLET MASK, GOWN AND GLOVES. HAND SANITIZED UPON ENTRANCE/EXIT. SLP BSSE orders received and acknowledged. A swallow evaluation warranted per RN dysphagia screening.  Pt Diet Recommendations - Solids: Regular  Diet Recommendations - Liquids: Thin Liquids    Compensatory Strategies Recommended: No straws; Slow rate; Alternate consistencies;Small bites and sips  Aspiration Precautions: Upright position; Slow rate;Small bites Functional Limits  Range of Motion: Within Functional Limits  Rate of Motion: Within Functional Limits    Voice Quality: Clear (minimal horseness)  Respiratory Status: Supplemental O2;Nasal cannula (2L via NC, 93%)  Consistencies Trialed: Thin liquids; Hard Angelia Schaeffer, SLP.     Meron Gonzáles, MS CCC-SLP/L  Speech Language Pathologist  EXT [phone]

## 2021-08-29 NOTE — PROGRESS NOTES
[facility]    Progress Note      Assessment and Plan:   1.   Acute on chronic respiratory failure–the patient has multifactorial respiratory failure with COPD, obesity–hypoventilation and untreated obstructive sleep apnea constituting the ov and no mass appreciable. Extremities without clubbing cyanosis nor edema. Trigger finger on right hand index finger. Neurologic grossly intact with symmetric tone and strength and reflex.   Skin without gross abnormality     Results:     Lab Results   C

## 2021-08-29 NOTE — PROGRESS NOTES
Dual RN skin check performed prior to transfer off the unit. Skin check performed by this RN and Corey Domingo RN. Wound are as follows: none. Will remain available for questions or concerns. Patient transferred to room 547. Report given to Collin.  Family at

## 2021-08-30 ENCOUNTER — APPOINTMENT (OUTPATIENT)
Dept: GENERAL RADIOLOGY | Facility: HOSPITAL | Age: 66
DRG: 097 | End: 2021-08-30
Attending: HOSPITALIST
Payer: MEDICARE

## 2021-08-30 PROBLEM — F05 DELIRIUM DUE TO MULTIPLE ETIOLOGIES: Status: ACTIVE | Noted: 2021-08-28

## 2021-08-30 PROBLEM — F39 EPISODIC MOOD DISORDER (HCC): Status: ACTIVE | Noted: 2021-08-30

## 2021-08-30 LAB — GLUCOSE BLDC GLUCOMTR-MCNC: 131 MG/DL (ref 70–99)

## 2021-08-30 PROCEDURE — 73560 X-RAY EXAM OF KNEE 1 OR 2: CPT | Performed by: HOSPITALIST

## 2021-08-30 PROCEDURE — 90792 PSYCH DIAG EVAL W/MED SRVCS: CPT | Performed by: OTHER

## 2021-08-30 PROCEDURE — 99233 SBSQ HOSP IP/OBS HIGH 50: CPT | Performed by: INTERNAL MEDICINE

## 2021-08-30 PROCEDURE — 99233 SBSQ HOSP IP/OBS HIGH 50: CPT | Performed by: HOSPITALIST

## 2021-08-30 RX ORDER — METHYLPREDNISOLONE SODIUM SUCCINATE 40 MG/ML
40 INJECTION, POWDER, LYOPHILIZED, FOR SOLUTION INTRAMUSCULAR; INTRAVENOUS EVERY 12 HOURS
Status: DISCONTINUED | OUTPATIENT
Start: 2021-08-30 | End: 2021-08-30

## 2021-08-30 RX ORDER — LAMOTRIGINE 25 MG/1
25 TABLET ORAL 2 TIMES DAILY
Status: DISCONTINUED | OUTPATIENT
Start: 2021-08-30 | End: 2021-09-01

## 2021-08-30 RX ORDER — METHYLPREDNISOLONE SODIUM SUCCINATE 40 MG/ML
40 INJECTION, POWDER, LYOPHILIZED, FOR SOLUTION INTRAMUSCULAR; INTRAVENOUS EVERY 24 HOURS
Status: DISCONTINUED | OUTPATIENT
Start: 2021-08-30 | End: 2021-08-31

## 2021-08-30 RX ORDER — IPRATROPIUM BROMIDE AND ALBUTEROL SULFATE 2.5; .5 MG/3ML; MG/3ML
3 SOLUTION RESPIRATORY (INHALATION) EVERY 6 HOURS PRN
Status: DISCONTINUED | OUTPATIENT
Start: 2021-08-30 | End: 2021-09-15

## 2021-08-30 RX ORDER — IPRATROPIUM BROMIDE AND ALBUTEROL SULFATE 2.5; .5 MG/3ML; MG/3ML
3 SOLUTION RESPIRATORY (INHALATION)
Status: DISCONTINUED | OUTPATIENT
Start: 2021-08-30 | End: 2021-09-03

## 2021-08-30 RX ORDER — THIAMINE HYDROCHLORIDE 100 MG/ML
100 INJECTION, SOLUTION INTRAMUSCULAR; INTRAVENOUS DAILY
Status: DISCONTINUED | OUTPATIENT
Start: 2021-08-30 | End: 2021-09-06

## 2021-08-30 RX ORDER — QUETIAPINE 25 MG/1
25 TABLET, FILM COATED ORAL NIGHTLY
Status: DISCONTINUED | OUTPATIENT
Start: 2021-08-30 | End: 2021-08-31

## 2021-08-30 RX ORDER — FOLIC ACID 1 MG/1
1 TABLET ORAL DAILY
Status: DISCONTINUED | OUTPATIENT
Start: 2021-08-30 | End: 2021-09-15

## 2021-08-30 NOTE — SLP NOTE
SPEECH DAILY NOTE - INPATIENT    ASSESSMENT & PLAN   ASSESSMENT  SLP f/u for ongoing meal assessment per recommendations of BSE 8/29/2021. RN reports pt tolerates diet and medication well with no overt clinical s/s aspiration.  Pt denies any swallowing chal REGULAR consistency and THIN liquids without overt signs or symptoms of aspiration with 100 % accuracy over 1-2 session(s).      Pt tolerated REGULAR/THIN diet with no CSA with 100% accuracy t/o session.    In Progress   Goal #2 The patient/family/caregiver

## 2021-08-30 NOTE — PLAN OF CARE
Had fall this morning. Hallucinating at times. Impulsive. Knee xrays done. On room air. Dr Doty Purchase on consult. Steroids decreased. Dr Isaiah Hannon saw pt.   Problem: Delirium  Goal: Minimize duration of delirium  Description: Interventions:  - Encourage use rhythm and assess patient for signs and symptoms of electrolyte imbalances  - Administer electrolyte replacement as ordered  - Monitor response to electrolyte replacements, including rhythm and repeat lab results as appropriate  - Fluid restriction as orde cultural backgrounds into the planning and delivery of care  - Encourage patient/family to participate in care and decision-making at the level they choose  - Honor patient and family perspectives and choices  Outcome: Progressing     Problem: Patient/Fami

## 2021-08-30 NOTE — PROGRESS NOTES
[facility]  Hospitalist Progress Note     Deisi Lo Sr. Patient Status:  Inpatient    1955  78year old [facility] [id number]   Location 54/[facility] Attending Crystal Luo MD   Hosp Day # 2 PCP PHYSICIAN NONSTAFF     Assessment & Plan: rashes, erythema, diaphoresis. Psych: Normal mood and affect. Calm, cooperative    Labs:  Recent Labs   Lab 08/28/21  1328 08/29/21  0513   RBC 4.61 4.32   HGB 14.9 14.1   HCT 47.1 44.4   .2* 102.8*   MCH 32.3 32.6   MCHC 31.6 31.8   RDW 13.0 12. 9

## 2021-08-30 NOTE — SIGNIFICANT EVENT
Significant Event - Fall Note    Date/Time of Fall: 08/30/24 at 21     Fall huddle completed: Yes    Description of patient fall:     Patient fell from: Standing     Activity when fall occurred: Ambulating without assistance or assistive devices     Where Ladonna Yoo RN           Was patient identified as high fall risk prior to fall:                  Score: 75             What interventions were in place prior to fall: Assistive devices (wheelchair, commode, walker, gait belt), Bed alarm, Bed in [facility] positio

## 2021-08-30 NOTE — PROGRESS NOTES
[facility]    Progress Note      Assessment and Plan:   1.   Acute on chronic respiratory failure–the patient has multifactorial respiratory failure with COPD, obesity–hypoventilation and untreated obstructive sleep apnea constituting the ov edema.  Trigger finger on right hand index finger. Neurologic grossly intact with symmetric tone and strength and reflex.   Skin without gross abnormality     Results:          Niko Prajapati MD  Medical Director, Critical Care, [facility]  Medical Director, E

## 2021-08-30 NOTE — BH PROGRESS NOTE
Psych Liaison provided outpatient therapy and psychiatry resources in Richardson's discharge instructions that are in network with [employer] and within 10 miles of his home    Salome BROWN, [address]

## 2021-08-30 NOTE — PLAN OF CARE
Problem: Delirium  Goal: Minimize duration of delirium  Description: Interventions:  - Encourage use of hearing aids, eye glasses  - Promote highest level of mobility daily  - Provide frequent reorientation  - Promote wakefulness i.e. lights on, blinds o Progressing     Problem: METABOLIC/FLUID AND ELECTROLYTES - ADULT  Goal: Electrolytes maintained within normal limits  Description: INTERVENTIONS:  - Monitor labs and rhythm and assess patient for signs and symptoms of electrolyte imbalances  - Administer PT/OT  - Encourage visually impaired, hearing impaired and aphasic patients to use assistive/communication devices  8/29/2021 1931 by Sarmad Heredia RN  Outcome: Progressing  8/29/2021 1930 by Sarmad Heredia RN  Outcome: Progressing     Problem:
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
## 2021-08-30 NOTE — PLAN OF CARE
Problem: Delirium  Goal: Minimize duration of delirium  Description: Interventions:  - Encourage use of hearing aids, eye glasses  - Promote highest level of mobility daily  - Provide frequent reorientation  - Promote wakefulness i.e. lights on, blinds o electrolyte replacements, including rhythm and repeat lab results as appropriate  - Fluid restriction as ordered  - Instruct patient on fluid and nutrition restrictions as appropriate  Outcome: Progressing     Problem: SKIN/TISSUE INTEGRITY - ADULT  Goal: choose  - Honor patient and family perspectives and choices  Outcome: Progressing     Problem: Patient/Family Goals  Goal: Patient/Family Long Term Goal  Description: Patient's Long Term Goal: Be discharged from hospital.    Interventions:  - Follow MD ord

## 2021-08-31 ENCOUNTER — APPOINTMENT (OUTPATIENT)
Dept: MRI IMAGING | Facility: HOSPITAL | Age: 66
DRG: 097 | End: 2021-08-31
Attending: Other
Payer: MEDICARE

## 2021-08-31 LAB
AMMONIA PLAS-MCNC: <10 UMOL/L (ref 11–32)
APTT PPP: 27.7 SECONDS (ref 23.2–35.3)
TSI SER-ACNC: 0.46 MIU/ML (ref 0.36–3.74)
VIT B12 SERPL-MCNC: 1297 PG/ML (ref 193–986)

## 2021-08-31 PROCEDURE — 99233 SBSQ HOSP IP/OBS HIGH 50: CPT | Performed by: OTHER

## 2021-08-31 PROCEDURE — 99233 SBSQ HOSP IP/OBS HIGH 50: CPT | Performed by: HOSPITALIST

## 2021-08-31 PROCEDURE — 99223 1ST HOSP IP/OBS HIGH 75: CPT | Performed by: OTHER

## 2021-08-31 PROCEDURE — 99232 SBSQ HOSP IP/OBS MODERATE 35: CPT | Performed by: INTERNAL MEDICINE

## 2021-08-31 PROCEDURE — 70551 MRI BRAIN STEM W/O DYE: CPT | Performed by: OTHER

## 2021-08-31 RX ORDER — DOXEPIN HYDROCHLORIDE 50 MG/1
1 CAPSULE ORAL DAILY
Status: DISCONTINUED | OUTPATIENT
Start: 2021-08-31 | End: 2021-09-15

## 2021-08-31 RX ORDER — QUETIAPINE 25 MG/1
50 TABLET, FILM COATED ORAL NIGHTLY
Status: DISCONTINUED | OUTPATIENT
Start: 2021-08-31 | End: 2021-09-03

## 2021-08-31 RX ORDER — HALOPERIDOL 5 MG/ML
1 INJECTION INTRAMUSCULAR EVERY 4 HOURS PRN
Status: DISCONTINUED | OUTPATIENT
Start: 2021-08-31 | End: 2021-09-08

## 2021-08-31 RX ORDER — PREDNISONE 20 MG/1
20 TABLET ORAL
Status: DISCONTINUED | OUTPATIENT
Start: 2021-08-31 | End: 2021-09-05

## 2021-08-31 NOTE — CONSULTS
Consult dictated. Wife provides most of the history. 2-week history of behavioral, cognitive changes, visual loose Nations. 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  Therefore, discussed with wife, need to obtain spinal

## 2021-08-31 NOTE — PROGRESS NOTES
[facility]  Hospitalist Progress Note     Srinivasan Mercado Sr. Patient Status:  Inpatient    1955  78year old CSN [id number]   [facility] Mary Grace Robertson, [facility] Day # 3 PCP PHYSICIAN NONSTAFF     Assessment & Plan: distress, wheezes, rales, rhonchi. Abd: Soft, NTND, BS normal, no mass, no HSM, no rebound/guarding. Neuro: Normal reflexes, CN. Sensory/motor exams grossly normal deficit. MS: No joint effusions. No peripheral edema. Skin: Skin is warm and dry.  No

## 2021-08-31 NOTE — CONSULTS
Physician Medicine & Rehabilitation Consult Note         SUBJECTIVE:    Chief Complaint: To assess rehabilitation needs following Mobility and ADL dysfunction s/p Acute on chronic respiratory failure with hypercapnia (HCC) as per request of . .     H FUNCTIONAL ADL ASSESSMENT  Grooming: CGA (standing at sink)   Feeding: n/t   Bathing: n/t   Toileting: n/t; did not need to go at this time   Upper Extremity Dressing: n/t-spouse wanted PCT to assist pt   Lower Extremity Dressing: min.  A         Past M Status:  HOME SITUATION  Type of Home: House   Home Layout: One level  Stairs to Enter : 5  Railing: Yes  Stairs to Bedroom: 0  Railing: No  Lives With: Spouse  Drives: Yes  Patient Owned Equipment: None  Patient Regularly Uses: None     Prior Level of Ind strength  Neuro: alert and oriented to self, hospital, situation, month not year, face grossly symmetrical  Finger-to-nose becomes increasingly impaired on right side's test continues, mild dysmetria on the left  Psych: Appropriate affect, cooperative    D insufficiency/failure  · Goal directed therapy with rehabilitation potential will be provided in the following domains.  Bed mobility or transfers , Cognitive, speech, language or swallowing retraining, Range of motion and strengthening and Ambulation  · Th

## 2021-08-31 NOTE — OCCUPATIONAL THERAPY NOTE
OCCUPATIONAL THERAPY TREATMENT NOTE - INPATIENT        Room Number: 547B/547-B           Presenting Problem:  (acute on chronic respiratory failure)    Problem List  Principal Problem:    Acute on chronic respiratory failure with hypercapnia (HCC)  Active spouse, pt continues to have hallucinations, however none present during therapy session today. Pt reporting no pain, however pt's spouse reports L calf pain. RN was notified. Pt did have a fall yesterday, landed on his knees.  Xray was neg for fx or disloc Modifier (G-Code): CK    FUNCTIONAL TRANSFER ASSESSMENT  Supine to Sit : Not tested (OOB at this time )  Sit to Stand: Minimum assistance  Toilet Transfer: n/t; did not need to go at this time   Shower Transfer: n/t   Chair Transfer: CGA w/RW     Joelle Moreira

## 2021-08-31 NOTE — PROGRESS NOTES
[facility]    Progress Note      Assessment and Plan:   1.   Acute on chronic respiratory failure–the patient has multifactorial respiratory failure with COPD, obesity–hypoventilation and untreated obstructive sleep apnea constituting the ov cyanosis nor edema. Trigger finger on right hand index finger. Neurologic grossly intact with symmetric tone and strength and reflex, although mild disorientation.   Skin without gross abnormality     Results:     Lab Results   Component Value Date    B12

## 2021-08-31 NOTE — OCCUPATIONAL THERAPY NOTE
RN cleared pt for OT session, however pt with psychiatry physician in extensive discussion. Will cont to follow.

## 2021-08-31 NOTE — PHYSICAL THERAPY NOTE
PHYSICAL THERAPY TREATMENT NOTE - INPATIENT     Room Number: 547B/547-B       Presenting Problem: respiratory failure     Problem List  Principal Problem:    Acute on chronic respiratory failure with hypercapnia (HCC)  Active Problems:    Respiratory acido due to LE fatigue. Coordination: FTN- Decreased speed with L UE. Patient was left in bedside chair, alarm activated and PCT present with pt  at end of session with all needs in reach.  The patient's Approx Degree of Impairment: 50.57% has been calcul etc.): A Little   -   Moving from lying on back to sitting on the side of the bed?: A Little   How much help from another person does the patient currently need. ..   -   Moving to and from a bed to a chair (including a wheelchair)?: A Little   -   Need to

## 2021-08-31 NOTE — CONSULTS
[facility]    Report of Consultation    Duyen Bush Sr. Patient Status:  Inpatient    1955 MRN [id number]   Location [facility] 5SW/SE Attending Payton Daugherty MD   Hosp Day # 2 PCP PHYSICIAN NONSTAFF     Date of Admissio also indicated that \"I knew it was not real\". The patient reported that nothing out of ordinary has happened prior to that other than he was distress physically with the move.   Patient denies any change in his drinking habit reporting that he drink av cannabis or illicit substance use and reports smoking half a pack of cigarettes daily until recently and is smoking a nicotine vape pen.       Medical History:       Past Medical History  Past Medical History:   Diagnosis Date   • Arthritis    • Atheroscler 5 mg, Oral, Daily  metoprolol succinate (Toprol XL) 24 hr tab 50 mg, 50 mg, Oral, BID  Heparin Sodium (Porcine) 5000 UNIT/ML injection 5,000 Units, 5,000 Units, Subcutaneous, 2 times per day  acetaminophen (TYLENOL) tab 650 mg, 650 mg, Oral, Q6H PRN      i 11/18/2016         Imaging:  XR KNEE (1 OR 2 VIEWS), LEFT (CPT=73560)    Result Date: 8/30/2021  CONCLUSION:  1. Osteoarthritis. 2. Chondrocalcinosis. 3. Old fracture proximal fibula. 4. Surgical clips medial soft tissues. 5. Joint effusion.  6. At the [facility] a robot  Intellect: Limited due to his delirium. Language: Appropriate in structure.   Insight/Judgment: somewhat limited/impaired d/t medical condition and sustained alcohol use    Impression:     Impression:        Delirium due to multiple etiologies (ch blood gas      Rapid SARS-CoV-2 by PCR      Clostridium difficile(toxigenic)PCR      Meds This Visit:  Requested Prescriptions      No prescriptions requested or ordered in this encounter           Valdo Bennett MD  8/30/2021

## 2021-09-01 LAB
ANION GAP SERPL CALC-SCNC: 6 MMOL/L (ref 0–18)
BASOPHILS # BLD AUTO: 0.02 X10(3) UL (ref 0–0.2)
BASOPHILS NFR BLD AUTO: 0.3 %
BUN BLD-MCNC: 17 MG/DL (ref 7–18)
BUN/CREAT SERPL: 30.4 (ref 10–20)
CALCIUM BLD-MCNC: 9.2 MG/DL (ref 8.5–10.1)
CHLORIDE SERPL-SCNC: 109 MMOL/L (ref 98–112)
CO2 SERPL-SCNC: 33 MMOL/L (ref 21–32)
CREAT BLD-MCNC: 0.56 MG/DL
DEPRECATED RDW RBC AUTO: 50.5 FL (ref 35.1–46.3)
EOSINOPHIL # BLD AUTO: 0.03 X10(3) UL (ref 0–0.7)
EOSINOPHIL NFR BLD AUTO: 0.4 %
ERYTHROCYTE [DISTWIDTH] IN BLOOD BY AUTOMATED COUNT: 13.7 % (ref 11–15)
GLUCOSE BLD-MCNC: 92 MG/DL (ref 70–99)
HCT VFR BLD AUTO: 42.6 %
HGB BLD-MCNC: 13.6 G/DL
IMM GRANULOCYTES # BLD AUTO: 0.03 X10(3) UL (ref 0–1)
IMM GRANULOCYTES NFR BLD: 0.4 %
LYMPHOCYTES # BLD AUTO: 2.3 X10(3) UL (ref 1–4)
LYMPHOCYTES NFR BLD AUTO: 30.5 %
MCH RBC QN AUTO: 32.2 PG (ref 26–34)
MCHC RBC AUTO-ENTMCNC: 31.9 G/DL (ref 31–37)
MCV RBC AUTO: 100.7 FL
MONOCYTES # BLD AUTO: 0.77 X10(3) UL (ref 0.1–1)
MONOCYTES NFR BLD AUTO: 10.2 %
NEUTROPHILS # BLD AUTO: 4.38 X10 (3) UL (ref 1.5–7.7)
NEUTROPHILS # BLD AUTO: 4.38 X10(3) UL (ref 1.5–7.7)
NEUTROPHILS NFR BLD AUTO: 58.2 %
OSMOLALITY SERPL CALC.SUM OF ELEC: 307 MOSM/KG (ref 275–295)
PLATELET # BLD AUTO: 161 10(3)UL (ref 150–450)
POTASSIUM SERPL-SCNC: 3.7 MMOL/L (ref 3.5–5.1)
RBC # BLD AUTO: 4.23 X10(6)UL
SODIUM SERPL-SCNC: 148 MMOL/L (ref 136–145)
WBC # BLD AUTO: 7.5 X10(3) UL (ref 4–11)

## 2021-09-01 PROCEDURE — 99232 SBSQ HOSP IP/OBS MODERATE 35: CPT | Performed by: OTHER

## 2021-09-01 PROCEDURE — 99233 SBSQ HOSP IP/OBS HIGH 50: CPT | Performed by: HOSPITALIST

## 2021-09-01 PROCEDURE — 99231 SBSQ HOSP IP/OBS SF/LOW 25: CPT | Performed by: INTERNAL MEDICINE

## 2021-09-01 RX ORDER — LAMOTRIGINE 25 MG/1
50 TABLET ORAL 2 TIMES DAILY
Status: DISCONTINUED | OUTPATIENT
Start: 2021-09-01 | End: 2021-09-08

## 2021-09-01 NOTE — PROGRESS NOTES
[facility]    Progress Note    Earnest Smith Sr. Patient Status:  Inpatient    1955 MRN [id number]   [facility] Attending Rashaun Hameed MD   Hosp Day # 4 PCP PHYSICIAN NONSTAFF       Subjective:   Antionette Catching (NORVASC) tab 5 mg, 5 mg, Oral, Daily  •  [Held by provider] aspirin chewable tab 81 mg, 81 mg, Oral, Daily  •  atorvastatin (LIPITOR) tab 40 mg, 40 mg, Oral, Nightly  •  [Held by provider] clopidogrel (PLAVIX) tab 75 mg, 75 mg, Oral, Daily  •  isosorbide 44.4 42.6   .2* 102.8* 100.7*   MCH 32.3 32.6 32.2   MCHC 31.6 31.8 31.9   RDW 13.0 12.9 13.7   NEPRELIM 4.66 3.77 4.38   WBC 8.7 4.5 7.5   .0 160.0 161.0         Recent Labs   Lab 08/28/21  1328 08/29/21  0513 09/01/21  0458   * 138*

## 2021-09-01 NOTE — PHYSICAL THERAPY NOTE
PHYSICAL THERAPY TREATMENT NOTE - INPATIENT     Room Number: 547B/547-B       Presenting Problem: respiratory failure     Problem List  Principal Problem:    Acute on chronic respiratory failure with hypercapnia (HCC)  Active Problems:    Respiratory acido training    SUBJECTIVE  Patient agreeable to therapy services    OBJECTIVE  Precautions: Bed/chair alarm    WEIGHT BEARING RESTRICTION  Weight Bearing Restriction: None  PAIN ASSESSMENT   Ratin    BALANCE Goal #4 Patient will negotiate 5 stairs/one curb w/ assistive device and supervision   Goal #4   Current Status  NT   Goal #5 Patient to demonstrate independence with home activity/exercise instructions provided to patient in preparation for discharge.

## 2021-09-01 NOTE — PROGRESS NOTES
Pulmonary/Critical Care Follow Up Note    HPI:   Kassandra Carver is a 78year old male with Patient presents with:  Altered Mental Status      PCP PHYSICIAN NONSTAFF  Admission Attending Shanta Martell MD    Hospital Day #4    Getting EEG  Following co dizzyness from bee stings. PHYSICAL EXAM:   Blood pressure 136/74, pulse 58, temperature 97.5 °F (36.4 °C), temperature source Oral, resp. rate 18, weight 206 lb 2.1 oz (93.5 kg), SpO2 93 %.     Intake/Output Summary (Last 24 hours) at 9/1/2021 154

## 2021-09-01 NOTE — PROGRESS NOTES
[facility]    Progress Note    Valiant Gosselin Sr. Patient Status:  Inpatient    1955 MRN [id number]   Location [facility] 5SW/SE Attending John Cordova MD   Hosp Day # 4 PCP PHYSICIAN NONSTAFF       Subjective:   Juan Chung Oral, BID  folic acid (FOLVITE) tab 1 mg, 1 mg, Oral, Daily  amLODIPine (NORVASC) tab 5 mg, 5 mg, Oral, Daily  [Held by provider] aspirin chewable tab 81 mg, 81 mg, Oral, Daily  atorvastatin (LIPITOR) tab 40 mg, 40 mg, Oral, Nightly  [Held by provider] joshua 8/30/2021  CONCLUSION:  1. Osteoarthritis. 2. Chondrocalcinosis. 3. Old fracture proximal fibula. 4. Surgical clips medial soft tissues. 5. Joint effusion. 6. At the lordosis. Dictated by (CST):  Luis F Mendoza MD on 8/30/2021 at 3:00 PM     Finalized

## 2021-09-01 NOTE — CM/SW NOTE
SW initiated self referral for discharge planning. PT/OT recommending acute rehab and PMR consult complete with acute rehab recommendation. SW initiated acute rehab referral via [facility].  Will provide list of available facilities for choice when available

## 2021-09-01 NOTE — PLAN OF CARE
Patient is A&Ox's 4. Patient is room air. Plavix and aspirin on hold  Patient has safety precautions in place bed in the lowest position, bed alarm on, and call light within reach. Continue to monitor patient with frequent nursing rounds.    Problem: Deliri maintained within normal limits  Description: INTERVENTIONS:  - Monitor labs and rhythm and assess patient for signs and symptoms of electrolyte imbalances  - Administer electrolyte replacement as ordered  - Monitor response to electrolyte replacements, in patient/family  - Incorporate patient and family knowledge, values, beliefs, and cultural backgrounds into the planning and delivery of care  - Encourage patient/family to participate in care and decision-making at the level they choose  - Honor patient an

## 2021-09-01 NOTE — CONSULTS
[facility]    PATIENT'S NAME: Nolan Brunson .   ATTENDING PHYSICIAN: Analisa Markham.  Haven Higgins MD   CONSULTING PHYSICIAN: Nguyen Pack MD   PATIENT ACCOUNT#:   [id number]    LOCATION:  [address] [place] [address]  MEDICAL RECORD #:   [id number]       DATE OF RAY Labs reviewed. B12 level 1297. Ammonia level less than 10. I do not see a recent thyroid function. WBC count is normal.     MRI scan was ordered by Dr. Roopa Foreman and I have reviewed the report.        IMPRESSION:  Cognitive behavioral changes, visual yohannes

## 2021-09-01 NOTE — PROGRESS NOTES
Patient is a 78year old   male with history of HTN, MI, COPD and with a chronic alcohol abuse who presented to the hospital with acute respiratory failure.    Consult Duration     The patient seen for over 35-minute, follow-up evaluation, for patient and was negative today with no acute intracranial process.     Medical History:       Past Medical History  Past Medical History:   Diagnosis Date   • Arthritis    • Atherosclerosis of coronary artery    • Essential hypertension    • Heart attac Daily  lisinopril tab 5 mg, 5 mg, Oral, Daily  metoprolol succinate (Toprol XL) 24 hr tab 50 mg, 50 mg, Oral, BID  Heparin Sodium (Porcine) 5000 UNIT/ML injection 5,000 Units, 5,000 Units, Subcutaneous, 2 times per day  acetaminophen (TYLENOL) tab 650 mg, 01/01/2021    TROP <0.045 08/28/2021     11/18/2016    B12 1,297 (H) 08/31/2021         Imaging:  XR KNEE (1 OR 2 VIEWS), LEFT (CPT=73560)    Result Date: 8/30/2021  CONCLUSION:  1. Osteoarthritis. 2. Chondrocalcinosis.  3. Old fracture proximal fibul sustained alcohol use    Impression:     Impression:        Delirium due to multiple etiologies (chronic alcoholism, sleep apnea and respiratory failure). Episodic mood disorder.   Acute on chronic respiratory failure with hypercapnia (HCC)  Respiratory ac Arbovirus/West Nile Ab Pnl,Csf      Vzv Real Time Pcr      PTT, Activated      Rapid SARS-CoV-2 by PCR      CSF culture      AFB Culture and Smear      FUNGUS CULTURE AND SMEAR      Meds This Visit:  Requested Prescriptions      No prescriptions requested

## 2021-09-01 NOTE — PLAN OF CARE
No acute changes overnight. Resting comfortably. BIPAP on. Vitals currently stable. Needing spinal tap but on anticoagulants. Plavix and aspirin on hold per MD. Fall risk precautions maintained. Will continue to monitor.      Problem: Delirium  Goal: Minim within normal limits  Description: INTERVENTIONS:  - Monitor labs and rhythm and assess patient for signs and symptoms of electrolyte imbalances  - Administer electrolyte replacement as ordered  - Monitor response to electrolyte replacements, including rhy patient/family  - Incorporate patient and family knowledge, values, beliefs, and cultural backgrounds into the planning and delivery of care  - Encourage patient/family to participate in care and decision-making at the level they choose  - Honor patient an

## 2021-09-01 NOTE — PROGRESS NOTES
Spoke to the nurse last night. Spinal tap being held due to the fact he was on aspirin, Plavix. These medicines have now been discontinued.

## 2021-09-02 LAB
ALBUMIN SERPL-MCNC: 3.1 G/DL (ref 3.4–5)
ANION GAP SERPL CALC-SCNC: 3 MMOL/L (ref 0–18)
BASOPHILS # BLD AUTO: 0.02 X10(3) UL (ref 0–0.2)
BASOPHILS NFR BLD AUTO: 0.3 %
BUN BLD-MCNC: 13 MG/DL (ref 7–18)
BUN/CREAT SERPL: 25 (ref 10–20)
CALCIUM BLD-MCNC: 8.7 MG/DL (ref 8.5–10.1)
CHLORIDE SERPL-SCNC: 109 MMOL/L (ref 98–112)
CO2 SERPL-SCNC: 34 MMOL/L (ref 21–32)
CREAT BLD-MCNC: 0.52 MG/DL
DEPRECATED RDW RBC AUTO: 50.4 FL (ref 35.1–46.3)
EOSINOPHIL # BLD AUTO: 0.07 X10(3) UL (ref 0–0.7)
EOSINOPHIL NFR BLD AUTO: 0.9 %
ERYTHROCYTE [DISTWIDTH] IN BLOOD BY AUTOMATED COUNT: 13.3 % (ref 11–15)
GLUCOSE BLD-MCNC: 96 MG/DL (ref 70–99)
HCT VFR BLD AUTO: 43.2 %
HGB BLD-MCNC: 14 G/DL
IMM GRANULOCYTES # BLD AUTO: 0.05 X10(3) UL (ref 0–1)
IMM GRANULOCYTES NFR BLD: 0.6 %
LYMPHOCYTES # BLD AUTO: 2.2 X10(3) UL (ref 1–4)
LYMPHOCYTES NFR BLD AUTO: 27.7 %
MCH RBC QN AUTO: 33 PG (ref 26–34)
MCHC RBC AUTO-ENTMCNC: 32.4 G/DL (ref 31–37)
MCV RBC AUTO: 101.9 FL
MONOCYTES # BLD AUTO: 0.72 X10(3) UL (ref 0.1–1)
MONOCYTES NFR BLD AUTO: 9.1 %
NEUTROPHILS # BLD AUTO: 4.87 X10 (3) UL (ref 1.5–7.7)
NEUTROPHILS # BLD AUTO: 4.87 X10(3) UL (ref 1.5–7.7)
NEUTROPHILS NFR BLD AUTO: 61.4 %
OSMOLALITY SERPL CALC.SUM OF ELEC: 302 MOSM/KG (ref 275–295)
PHOSPHATE SERPL-MCNC: 3.1 MG/DL (ref 2.5–4.9)
PLATELET # BLD AUTO: 158 10(3)UL (ref 150–450)
POTASSIUM SERPL-SCNC: 3.5 MMOL/L (ref 3.5–5.1)
RBC # BLD AUTO: 4.24 X10(6)UL
SODIUM SERPL-SCNC: 146 MMOL/L (ref 136–145)
WBC # BLD AUTO: 7.9 X10(3) UL (ref 4–11)

## 2021-09-02 PROCEDURE — 99232 SBSQ HOSP IP/OBS MODERATE 35: CPT | Performed by: OTHER

## 2021-09-02 PROCEDURE — 99233 SBSQ HOSP IP/OBS HIGH 50: CPT | Performed by: HOSPITALIST

## 2021-09-02 PROCEDURE — 95816 EEG AWAKE AND DROWSY: CPT | Performed by: OTHER

## 2021-09-02 PROCEDURE — 99232 SBSQ HOSP IP/OBS MODERATE 35: CPT | Performed by: INTERNAL MEDICINE

## 2021-09-02 NOTE — PHYSICAL THERAPY NOTE
PHYSICAL THERAPY TREATMENT NOTE - INPATIENT     Room Number: 547B/547-B       Presenting Problem: respiratory failure     Problem List  Principal Problem:    Acute on chronic respiratory failure with hypercapnia (HCC)  Active Problems:    Respiratory acido training;Transfer training;Balance training    SUBJECTIVE  Patient agreeable to therapy session    OBJECTIVE  Precautions: Bed/chair alarm    WEIGHT BEARING RESTRICTION  Weight Bearing Restriction: None    PAIN ASSESSMENT   Ratin    BALANCE Status CGA   Goal #4 Patient will negotiate 5 stairs/one curb w/ assistive device and supervision   Goal #4   Current Status NT   Goal #5 Patient to demonstrate independence with home activity/exercise instructions provided to patient in preparation for [facility]

## 2021-09-02 NOTE — OCCUPATIONAL THERAPY NOTE
OCCUPATIONAL THERAPY TREATMENT NOTE - INPATIENT        Room Number: 547B/547-B           Presenting Problem:  (acute on chronic respiratory failure)    Problem List  Principal Problem:    Acute on chronic respiratory failure with hypercapnia (HCC)  Active training    SUBJECTIVE  Pt seen bedside and agreeable for OT session     OBJECTIVE  Precautions: Bed/chair alarm    WEIGHT BEARING RESTRICTION  Weight Bearing Restriction: None                PAIN ASSESSMENT  Ratin           ACTIVITY TOLERANCE complete functional transfer with spv   Comment: Progressing-min-  w/RW     Patient will complete toileting with spv Min asisst with urinating     Patient will tolerate standing for 3-5 minutes in prep for adls with spv   Comment: Progressing-4 minutes wit

## 2021-09-02 NOTE — PROGRESS NOTES
Patient is a 78year old   male with history of HTN, MI, COPD and with a chronic alcohol abuse who presented to the hospital with acute respiratory failure.    Consult Duration     The patient seen for over 25-minute, follow-up evaluation, Family History  History reviewed. No pertinent family history.     Social History  Social History    Tobacco Use      Smoking status: Former Smoker        Types: Cigarettes        Quit date: 2016        Years since quittin.0      Smokeless to Tab, TK 1 T PO Q 8 H PRN  AmLODIPine Besylate 5 MG Oral Tab, TK 1 T PO  QD  HYDROcodone-acetaminophen  MG Oral Tab, Take 1 tablet by mouth every 6 (six) hours as needed for Pain. Clopidogrel Bisulfate 75 MG Oral Tab, Take 75 mg by mouth daily.   Ator artifact.     Dictated by (CST): Alan Young MD on 8/31/2021 at 8:28 AM     Finalized by (CST): Alan Young MD on 8/31/2021 at 8:31 AM            Vital Signs:     Blood pressure 136/74, pulse 58, temperature 97.5 °F (36.4 °C), temperature source Oral, alcoholism and chronic sleep apnea. Discussed risk and benefit, acknowledging the current symptom and severity. At this point, I would recommend the following approach:     1. Focus on education and support.   2.  Focus on insight orientation and encou

## 2021-09-02 NOTE — PROGRESS NOTES
[facility]    Progress Note    Elvira Santos Sr. Patient Status:  Inpatient    1955 MRN [id number]   Location [facility] 5SW/SE Attending Nadine Hurtado MD   Hosp Day # 5 PCP PHYSICIAN NONSTAFF       Subjective:   eTlly Interiano Nightly  [Held by provider] clopidogrel (PLAVIX) tab 75 mg, 75 mg, Oral, Daily  isosorbide mononitrate ER (IMDUR) 24 hr tab 60 mg, 60 mg, Oral, Daily  lisinopril tab 5 mg, 5 mg, Oral, Daily  metoprolol succinate (Toprol XL) 24 hr tab 50 mg, 50 mg, Oral, BI

## 2021-09-02 NOTE — PROGRESS NOTES
[facility]    Progress Note    Earnest Smith Sr. Patient Status:  Inpatient    1955 MRN [id number]   Location [facility] 5SW/SE Attending Rashaun Hameed MD   Hosp Day # 5 PCP PHYSICIAN NONSTAFF       Subjective:   Antionette Catching mg, 81 mg, Oral, Daily  •  atorvastatin (LIPITOR) tab 40 mg, 40 mg, Oral, Nightly  •  [Held by provider] clopidogrel (PLAVIX) tab 75 mg, 75 mg, Oral, Daily  •  isosorbide mononitrate ER (IMDUR) 24 hr tab 60 mg, 60 mg, Oral, Daily  •  lisinopril tab 5 mg, 5 44.4 42.6 43.2   .8* 100.7* 101.9*   MCH 32.6 32.2 33.0   MCHC 31.8 31.9 32.4   RDW 12.9 13.7 13.3   NEPRELIM 3.77 4.38 4.87   WBC 4.5 7.5 7.9   .0 161.0 158.0         Recent Labs   Lab 08/29/21  0513 09/01/21  0458 09/02/21  0516   *

## 2021-09-02 NOTE — PROGRESS NOTES
[facility]    Progress Note      Assessment and Plan:   1.   Acute on chronic respiratory failure–the patient has multifactorial respiratory failure with COPD, obesity–hypoventilation and untreated obstructive sleep apnea constituting the ov finger. Neurologic grossly intact with symmetric tone and strength and reflex, although mild disorientation.   Skin without gross abnormality     Results:     Lab Results   Component Value Date    WBC 7.9 09/02/2021    HGB 14.0 09/02/2021    HCT 43.2 09/02

## 2021-09-03 LAB
ANION GAP SERPL CALC-SCNC: 2 MMOL/L (ref 0–18)
BASOPHILS # BLD AUTO: 0.03 X10(3) UL (ref 0–0.2)
BASOPHILS NFR BLD AUTO: 0.4 %
BUN BLD-MCNC: 15 MG/DL (ref 7–18)
BUN/CREAT SERPL: 26.3 (ref 10–20)
CALCIUM BLD-MCNC: 9.2 MG/DL (ref 8.5–10.1)
CHLORIDE SERPL-SCNC: 106 MMOL/L (ref 98–112)
CO2 SERPL-SCNC: 35 MMOL/L (ref 21–32)
CREAT BLD-MCNC: 0.57 MG/DL
DEPRECATED RDW RBC AUTO: 50.5 FL (ref 35.1–46.3)
EOSINOPHIL # BLD AUTO: 0.11 X10(3) UL (ref 0–0.7)
EOSINOPHIL NFR BLD AUTO: 1.3 %
ERYTHROCYTE [DISTWIDTH] IN BLOOD BY AUTOMATED COUNT: 13.3 % (ref 11–15)
GLUCOSE BLD-MCNC: 100 MG/DL (ref 70–99)
HAV IGM SER QL: 1.9 MG/DL (ref 1.6–2.6)
HCT VFR BLD AUTO: 43.5 %
HGB BLD-MCNC: 14 G/DL
IMM GRANULOCYTES # BLD AUTO: 0.09 X10(3) UL (ref 0–1)
IMM GRANULOCYTES NFR BLD: 1.1 %
LYMPHOCYTES # BLD AUTO: 2.32 X10(3) UL (ref 1–4)
LYMPHOCYTES NFR BLD AUTO: 27.5 %
MCH RBC QN AUTO: 32.9 PG (ref 26–34)
MCHC RBC AUTO-ENTMCNC: 32.2 G/DL (ref 31–37)
MCV RBC AUTO: 102.1 FL
MONOCYTES # BLD AUTO: 0.77 X10(3) UL (ref 0.1–1)
MONOCYTES NFR BLD AUTO: 9.1 %
NEUTROPHILS # BLD AUTO: 5.13 X10 (3) UL (ref 1.5–7.7)
NEUTROPHILS # BLD AUTO: 5.13 X10(3) UL (ref 1.5–7.7)
NEUTROPHILS NFR BLD AUTO: 60.6 %
OSMOLALITY SERPL CALC.SUM OF ELEC: 297 MOSM/KG (ref 275–295)
PLATELET # BLD AUTO: 156 10(3)UL (ref 150–450)
POTASSIUM SERPL-SCNC: 3.9 MMOL/L (ref 3.5–5.1)
PROCALCITONIN SERPL-MCNC: <0.02 NG/ML (ref ?–0.16)
RBC # BLD AUTO: 4.26 X10(6)UL
SODIUM SERPL-SCNC: 143 MMOL/L (ref 136–145)
WBC # BLD AUTO: 8.5 X10(3) UL (ref 4–11)

## 2021-09-03 PROCEDURE — 99232 SBSQ HOSP IP/OBS MODERATE 35: CPT | Performed by: INTERNAL MEDICINE

## 2021-09-03 PROCEDURE — 99232 SBSQ HOSP IP/OBS MODERATE 35: CPT | Performed by: OTHER

## 2021-09-03 PROCEDURE — 99232 SBSQ HOSP IP/OBS MODERATE 35: CPT | Performed by: HOSPITALIST

## 2021-09-03 RX ORDER — OLANZAPINE 5 MG/1
5 TABLET ORAL NIGHTLY
Status: DISCONTINUED | OUTPATIENT
Start: 2021-09-03 | End: 2021-09-08

## 2021-09-03 RX ORDER — IPRATROPIUM BROMIDE AND ALBUTEROL SULFATE 2.5; .5 MG/3ML; MG/3ML
3 SOLUTION RESPIRATORY (INHALATION)
Status: DISCONTINUED | OUTPATIENT
Start: 2021-09-03 | End: 2021-09-15

## 2021-09-03 NOTE — OCCUPATIONAL THERAPY NOTE
OCCUPATIONAL THERAPY TREATMENT NOTE - INPATIENT        Room Number: 547B/547-B           Presenting Problem:  (acute on chronic respiratory failure)    Problem List  Principal Problem:    Acute on chronic respiratory failure with hypercapnia (HCC)  Active SATURATIONS  Oxygen Therapy  SPO2% Ambulation on Oxygen: 92    ACTIVITIES OF DAILY LIVING ASSESSMENT  AM-PAC ‘6-Clicks’ Inpatient Daily Activity Short Form  How much help from another person does the patient currently need…  -   Putting on and taking off r to wash hands and brush hair    Patient will complete LB dressing with spv  Comment: Progressing -mod.  A for pull up             Goals  on: 9/10  Frequency: 3-5x

## 2021-09-03 NOTE — PHYSICAL THERAPY NOTE
PHYSICAL THERAPY TREATMENT NOTE - INPATIENT     Room Number: 547B/547-B       Presenting Problem: respiratory failure     Problem List  Principal Problem:    Acute on chronic respiratory failure with hypercapnia (HCC)  Active Problems:    Respiratory acido education;Gait training;Neuromuscular re-educate; Coordination;Range of motion;Strengthening;Stoop training;Stair training;Transfer training;Balance training    SUBJECTIVE  Patient agreeable to tehrapy services    OBJECTIVE  Precautions: Bed/chair alarm level: modified independent with walker - rolling      Goal #2  Current Status CGA   Goal #3 Patient is able to ambulate 100 feet with assist device: none at assistance level: independent   Goal #3   Current Status Supervision    Goal #4 Patient will negot

## 2021-09-03 NOTE — PROGRESS NOTES
[facility]    Progress Note    Gladis Mcnulty Sr. Patient Status:  Inpatient    1955 MRN [id number]   Location [facility] 5SW/SE Attending Brown Cartwright MD   Hosp Day # 6 PCP PHYSICIAN NONSTAFF       Subjective:   Effie Tovar Daily  lisinopril tab 5 mg, 5 mg, Oral, Daily  metoprolol succinate (Toprol XL) 24 hr tab 50 mg, 50 mg, Oral, BID  Heparin Sodium (Porcine) 5000 UNIT/ML injection 5,000 Units, 5,000 Units, Subcutaneous, 2 times per day  acetaminophen (TYLENOL) tab 650 mg,

## 2021-09-03 NOTE — PROGRESS NOTES
[facility]    Progress Note    Marija Churchill Sr. Patient Status:  Inpatient    1955 MRN [id number]   Location [facility] 5SW/SE Attending Jaye Mittal MD   Hosp Day # 6 PCP PHYSICIAN NONSTAFF       Subjective:   Ginny Alarcon (NORVASC) tab 5 mg, 5 mg, Oral, Daily  •  [Held by provider] aspirin chewable tab 81 mg, 81 mg, Oral, Daily  •  atorvastatin (LIPITOR) tab 40 mg, 40 mg, Oral, Nightly  •  [Held by provider] clopidogrel (PLAVIX) tab 75 mg, 75 mg, Oral, Daily  •  isosorbide 14.0 14.0   HCT 42.6 43.2 43.5   .7* 101.9* 102.1*   MCH 32.2 33.0 32.9   MCHC 31.9 32.4 32.2   RDW 13.7 13.3 13.3   NEPRELIM 4.38 4.87 5.13   WBC 7.5 7.9 8.5   .0 158.0 156.0         Recent Labs   Lab 09/01/21  0458 09/02/21  0516 09/03/21

## 2021-09-03 NOTE — PROCEDURES
[facility], [address]      PATIENT'S NAME: Blade Wood .   ATTENDING PHYSICIAN: Graciela Last MD   PATIENT ACCOUNT #: [de-identified] LOCATION: [address] [place] [address]   MEDICAL RECORD #: [id number] DATE OF BIRTH: [birth year]

## 2021-09-03 NOTE — PROGRESS NOTES
Pulmonary/Critical Care Follow Up Note    HPI:   Saul Wallace. is a 78year old male with Patient presents with:  Altered Mental Status      PCP PHYSICIAN NONSTAFF  Admission Attending Caitlyn Colin MD    Hospital Day #6    C/o some sob  No fever  O Comment:Itching and dizzyness from bee stings. PHYSICAL EXAM:   Blood pressure 97/63, pulse 76, temperature 98.2 °F (36.8 °C), temperature source Oral, resp. rate 18, weight 206 lb 2.1 oz (93.5 kg), SpO2 95 %.     Intake/Output Summary (Last 24 hours

## 2021-09-03 NOTE — PLAN OF CARE
Pt safety maintained. VS stable. Alert to self. Bed alarm on. Chair alarm on. CPAP placed at night. Pt did not toleraed intially. Pt was restless. Haldol gven. Pt is currently calm on bed. IVL intact patent.  Ambulate to the bathroom with assist. IVL intant planning and delivery of care  - Encourage patient/family to participate in care and decision-making at the level they choose  - Honor patient and family perspectives and choices  Outcome: Progressing     Problem: Delirium  Goal: Minimize duration of delir functionality and self care  Description: INTERVENTIONS  - Monitor swallowing and airway patency with patient fatigue and changes in neurological status  - Encourage and assist patient to increase activity and self care with guidance from PT/OT  - Encourag

## 2021-09-04 LAB
TOXOPLASMA GONDII AB, IGG: 4.7 IU/ML
TOXOPLASMA GONDII AB, IGM: <3 AU/ML

## 2021-09-04 PROCEDURE — 99232 SBSQ HOSP IP/OBS MODERATE 35: CPT | Performed by: INTERNAL MEDICINE

## 2021-09-04 PROCEDURE — 99232 SBSQ HOSP IP/OBS MODERATE 35: CPT | Performed by: HOSPITALIST

## 2021-09-04 NOTE — PLAN OF CARE
Problem: RESPIRATORY - ADULT  Goal: Achieves optimal ventilation and oxygenation  Description: INTERVENTIONS:  - Assess for changes in respiratory status  - Assess for changes in mentation and behavior  - Position to facilitate oxygenation and minimize r interventions, skin care algorithm/standards of care as needed  Outcome: Progressing     Problem: Patient Centered Care  Goal: Patient preferences are identified and integrated in the patient's plan of care  Description: Interventions:  - What would you li parameters to optimize cerebral perfusion and minimize risk of hemorrhage  - Monitor temperature, glucose, and sodium.  Initiate appropriate interventions as ordered  Outcome: Not Progressing  Goal: Achieves maximal functionality and self care  Description:

## 2021-09-04 NOTE — PROGRESS NOTES
Patient is a 78year old   male with history of HTN, MI, COPD and with a chronic alcohol abuse who presented to the hospital with acute respiratory failure.    Consult Duration     The patient seen for over 25-minute, follow-up evaluation, REPLACEMENT SURGERY Right    • REPAIR ROTATOR CUFF,ACUTE Left approx. 2007   • REPAIR ROTATOR CUFF,ACUTE Right approx. 2009       Family History  History reviewed. No pertinent family history.     Social History  Social History    Tobacco Use      Smoking s Mononitrate ER 60 MG Oral Tablet 24 Hr, Take 60 mg by mouth daily.   lisinopril 5 MG Oral Tab, TK 1 T PO  QD UTD  Cyclobenzaprine HCl 10 MG Oral Tab, TK 1 T PO Q 8 H PRN  AmLODIPine Besylate 5 MG Oral Tab, TK 1 T PO  QD  HYDROcodone-acetaminophen  MG demonstrate any psychomotor agitation or retardation and no noticeable tremors. Attitude/Coorperation: receptive/cooperative  Behavior: intermittent eye contact  Speech: normal rate, rhythm, and volume with no dysarthria.   Mood:  Reporting feeling very With Platelet      Basic Metabolic Panel (8)      Urinalysis with Culture Reflex      Arterial blood gas      Drug Abuse Panel 10 screen      Troponin I      Comp Metabolic Panel (14)      CBC With Differential With Platelet      Arterial blood gas      Vi

## 2021-09-04 NOTE — PROGRESS NOTES
[facility]    Progress Note    Delio Roberts Sr. Patient Status:  Inpatient    1955 MRN [id number]   Location [facility] 5SW/SE Attending Ellie Henderson MD   Hosp Day # 7 PCP PHYSICIAN NONSTAFF       Subjective:   Rue Closs (NORVASC) tab 5 mg, 5 mg, Oral, Daily  •  [Held by provider] aspirin chewable tab 81 mg, 81 mg, Oral, Daily  •  atorvastatin (LIPITOR) tab 40 mg, 40 mg, Oral, Nightly  •  [Held by provider] clopidogrel (PLAVIX) tab 75 mg, 75 mg, Oral, Daily  •  isosorbide 09/02/21  0516 09/03/21  0519   RBC 4.23 4.24 4.26   HGB 13.6 14.0 14.0   HCT 42.6 43.2 43.5   .7* 101.9* 102.1*   MCH 32.2 33.0 32.9   MCHC 31.9 32.4 32.2   RDW 13.7 13.3 13.3   NEPRELIM 4.38 4.87 5.13   WBC 7.5 7.9 8.5   .0 158.0 156.0
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
## 2021-09-04 NOTE — PROGRESS NOTES
[facility]     Progress Note        Angelica Simmonds Sr. Patient Status:  Inpatient    1955 MRN [id number]   Location [facility] 5SW/SE Attending Kareen Parra MD   Hosp Day # 7 PCP PHYSICIAN NONSTAFF       Subjective:   P PRN        Continuous Infusions:     Physical Exam  Constitutional: no acute distress  Eyes: PERRL  ENT: nares pateint  Neck: supple, no JVD  Cardio: RRR, S1 S2  Respiratory: Slightly diminished basilar breath sounds  GI: abdomen soft, non tender, active b

## 2021-09-05 LAB
ALBUMIN SERPL-MCNC: 3.4 G/DL (ref 3.4–5)
ANION GAP SERPL CALC-SCNC: 5 MMOL/L (ref 0–18)
BASOPHILS # BLD AUTO: 0.07 X10(3) UL (ref 0–0.2)
BASOPHILS NFR BLD AUTO: 0.6 %
BUN BLD-MCNC: 14 MG/DL (ref 7–18)
BUN/CREAT SERPL: 25.5 (ref 10–20)
CALCIUM BLD-MCNC: 9.1 MG/DL (ref 8.5–10.1)
CHLORIDE SERPL-SCNC: 106 MMOL/L (ref 98–112)
CO2 SERPL-SCNC: 29 MMOL/L (ref 21–32)
CREAT BLD-MCNC: 0.55 MG/DL
DEPRECATED RDW RBC AUTO: 50.2 FL (ref 35.1–46.3)
EOSINOPHIL # BLD AUTO: 0.15 X10(3) UL (ref 0–0.7)
EOSINOPHIL NFR BLD AUTO: 1.4 %
ERYTHROCYTE [DISTWIDTH] IN BLOOD BY AUTOMATED COUNT: 13.2 % (ref 11–15)
GLUCOSE BLD-MCNC: 91 MG/DL (ref 70–99)
HCT VFR BLD AUTO: 45.7 %
HGB BLD-MCNC: 14.7 G/DL
IMM GRANULOCYTES # BLD AUTO: 0.16 X10(3) UL (ref 0–1)
IMM GRANULOCYTES NFR BLD: 1.5 %
LYMPHOCYTES # BLD AUTO: 3.16 X10(3) UL (ref 1–4)
LYMPHOCYTES NFR BLD AUTO: 28.9 %
MCH RBC QN AUTO: 32.8 PG (ref 26–34)
MCHC RBC AUTO-ENTMCNC: 32.2 G/DL (ref 31–37)
MCV RBC AUTO: 102 FL
MONOCYTES # BLD AUTO: 0.95 X10(3) UL (ref 0.1–1)
MONOCYTES NFR BLD AUTO: 8.7 %
NEUTROPHILS # BLD AUTO: 6.44 X10 (3) UL (ref 1.5–7.7)
NEUTROPHILS # BLD AUTO: 6.44 X10(3) UL (ref 1.5–7.7)
NEUTROPHILS NFR BLD AUTO: 58.9 %
OSMOLALITY SERPL CALC.SUM OF ELEC: 290 MOSM/KG (ref 275–295)
PHOSPHATE SERPL-MCNC: 2.3 MG/DL (ref 2.5–4.9)
PLATELET # BLD AUTO: 173 10(3)UL (ref 150–450)
POTASSIUM SERPL-SCNC: 4 MMOL/L (ref 3.5–5.1)
RBC # BLD AUTO: 4.48 X10(6)UL
SODIUM SERPL-SCNC: 140 MMOL/L (ref 136–145)
WBC # BLD AUTO: 10.9 X10(3) UL (ref 4–11)

## 2021-09-05 PROCEDURE — 99232 SBSQ HOSP IP/OBS MODERATE 35: CPT | Performed by: HOSPITALIST

## 2021-09-05 PROCEDURE — 99231 SBSQ HOSP IP/OBS SF/LOW 25: CPT | Performed by: OTHER

## 2021-09-05 RX ORDER — HALOPERIDOL 5 MG/ML
5 INJECTION INTRAMUSCULAR ONCE
Status: COMPLETED | OUTPATIENT
Start: 2021-09-05 | End: 2021-09-05

## 2021-09-05 RX ORDER — LORAZEPAM 2 MG/ML
0.5 INJECTION INTRAMUSCULAR ONCE
Status: COMPLETED | OUTPATIENT
Start: 2021-09-05 | End: 2021-09-05

## 2021-09-05 NOTE — PLAN OF CARE
Patient stable; no acute changes overnight. Confusion and hallucinations continue. Pt very impulsive and unaware of safety. Fall precautions in place. Call light within reach.      Problem: RESPIRATORY - ADULT  Goal: Achieves optimal ventilation and oxygena INTERVENTIONS  - Assess and document risk factors for pressure ulcer development  - Assess and document skin integrity  - Monitor for areas of redness and/or skin breakdown  - Initiate interventions, skin care algorithm/standards of care as needed  Outcome Assess for and report changes in neurological status  - Initiate measures to prevent increased intracranial pressure  - Maintain blood pressure and fluid volume within ordered parameters to optimize cerebral perfusion and minimize risk of hemorrhage  - Mon

## 2021-09-05 NOTE — PROGRESS NOTES
[facility]    Progress Note    Delio Roberts Sr. Patient Status:  Inpatient    1955 MRN [id number]   Location [facility] 5SW/SE Attending Ellie Henderson MD   Hosp Day # 8 PCP PHYSICIAN NONSTAFF       Subjective:   Rue Closs (FOLVITE) tab 1 mg, 1 mg, Oral, Daily  •  amLODIPine (NORVASC) tab 5 mg, 5 mg, Oral, Daily  •  [Held by provider] aspirin chewable tab 81 mg, 81 mg, Oral, Daily  •  atorvastatin (LIPITOR) tab 40 mg, 40 mg, Oral, Nightly  •  [Held by provider] clopidogrel ( code  dispo: Acute rehab upon medical clearance, await LP      Results:     Recent Labs   Lab 09/02/21  0516 09/03/21  0519 09/05/21  0620   RBC 4.24 4.26 4.48   HGB 14.0 14.0 14.7   HCT 43.2 43.5 45.7   .9* 102.1* 102.0*   MCH 33.0 32.9 32.8   MCHC

## 2021-09-05 NOTE — PROGRESS NOTES
[facility]  [address] [facility], [address] [place] [address]  [phone]          INPATIENT NEUROLOGY   FOLLOW UP CONSULT NOTE       [facility]    Report of Consultation    Dominik Philip Sr. Patient Status:  Infranny Lymphocytes %      % 28.9     Monocytes %      % 8.7     Eosinophils %      % 1.4     Basophils %      % 0.6     Immature Granulocyte %      % 1.5     Glucose      70 - 99 mg/dL 91     Sodium      136 - 145 mmol/L 140     Potassium      3.5 - 5.1 mmol/L errors during dictation, discrepancies may still exist    Alanis Faustin DO   Staff Neurologist   9/5/2021  8:16 AM

## 2021-09-06 LAB — PHOSPHATE SERPL-MCNC: 2.3 MG/DL (ref 2.5–4.9)

## 2021-09-06 PROCEDURE — 99233 SBSQ HOSP IP/OBS HIGH 50: CPT | Performed by: HOSPITALIST

## 2021-09-06 RX ORDER — LORAZEPAM 2 MG/ML
1 INJECTION INTRAMUSCULAR EVERY 6 HOURS PRN
Status: DISCONTINUED | OUTPATIENT
Start: 2021-09-06 | End: 2021-09-15

## 2021-09-06 RX ORDER — MELATONIN
100 DAILY
Status: DISCONTINUED | OUTPATIENT
Start: 2021-09-06 | End: 2021-09-08
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
## 2021-09-06 NOTE — PLAN OF CARE
Tylenol PRN for pain. Family at bedside. Q2 restraint assessment completed per protocol. Restraints discontinued, MD aware. Alert and oriented x2. Pleasant. Primofit in place. Camera monitoring in place.  Bed in lowest position and safety precautions in didi ADULT  Goal: Electrolytes maintained within normal limits  Description: INTERVENTIONS:  - Monitor labs and rhythm and assess patient for signs and symptoms of electrolyte imbalances  - Administer electrolyte replacement as ordered  - Monitor response to el accurate information to patient/family  - Incorporate patient and family knowledge, values, beliefs, and cultural backgrounds into the planning and delivery of care  - Encourage patient/family to participate in care and decision-making at the level they ch

## 2021-09-06 NOTE — PROGRESS NOTES
[facility]    Progress Note    Srinivasan Mercado Sr. Patient Status:  Inpatient    1955 MRN [id number]   Location [facility] 5SW/SE Attending Kian Atkins MD   Hosp Day # 9 PCP PHYSICIAN NONSTAFF       Subjective:   Arnold Weeks Nightly  •  [Held by provider] clopidogrel (PLAVIX) tab 75 mg, 75 mg, Oral, Daily  •  isosorbide mononitrate ER (IMDUR) 24 hr tab 60 mg, 60 mg, Oral, Daily  •  lisinopril tab 5 mg, 5 mg, Oral, Daily  •  metoprolol succinate (Toprol XL) 24 hr tab 50 mg, 50 43.2 43.5 45.7   .9* 102.1* 102.0*   MCH 33.0 32.9 32.8   MCHC 32.4 32.2 32.2   RDW 13.3 13.3 13.2   NEPRELIM 4.87 5.13 6.44   WBC 7.9 8.5 10.9   .0 156.0 173.0         Recent Labs   Lab 09/02/21  0516 09/03/21  0519 09/05/21  0620   GLU 96 1

## 2021-09-07 ENCOUNTER — APPOINTMENT (OUTPATIENT)
Dept: GENERAL RADIOLOGY | Facility: HOSPITAL | Age: 66
DRG: 097 | End: 2021-09-07
Attending: Other
Payer: MEDICARE

## 2021-09-07 LAB
ALBUMIN SERPL-MCNC: 3.5 G/DL (ref 3.4–5)
ANION GAP SERPL CALC-SCNC: 2 MMOL/L (ref 0–18)
BASOPHILS # BLD AUTO: 0.08 X10(3) UL (ref 0–0.2)
BASOPHILS NFR BLD AUTO: 0.7 %
BUN BLD-MCNC: 14 MG/DL (ref 7–18)
BUN/CREAT SERPL: 24.1 (ref 10–20)
CALCIUM BLD-MCNC: 8.8 MG/DL (ref 8.5–10.1)
CHLORIDE SERPL-SCNC: 103 MMOL/L (ref 98–112)
CO2 SERPL-SCNC: 33 MMOL/L (ref 21–32)
COLOR CSF: COLORLESS
CREAT BLD-MCNC: 0.58 MG/DL
CRYPTOCOCCAL ANTIGEN CSF: NEGATIVE
DEPRECATED RDW RBC AUTO: 48.1 FL (ref 35.1–46.3)
EOSINOPHIL # BLD AUTO: 0.18 X10(3) UL (ref 0–0.7)
EOSINOPHIL NFR BLD AUTO: 1.7 %
ERYTHROCYTE [DISTWIDTH] IN BLOOD BY AUTOMATED COUNT: 12.7 % (ref 11–15)
GLUCOSE BLD-MCNC: 102 MG/DL (ref 70–99)
GLUCOSE CSF-MCNC: 59 MG/DL (ref 40–70)
HAV IGM SER QL: 2 MG/DL (ref 1.6–2.6)
HCT VFR BLD AUTO: 45.9 %
HGB BLD-MCNC: 15 G/DL
IMM GRANULOCYTES # BLD AUTO: 0.12 X10(3) UL (ref 0–1)
IMM GRANULOCYTES NFR BLD: 1.1 %
LYMPHOCYTES # BLD AUTO: 2.49 X10(3) UL (ref 1–4)
LYMPHOCYTES NFR BLD AUTO: 23.1 %
MCH RBC QN AUTO: 33 PG (ref 26–34)
MCHC RBC AUTO-ENTMCNC: 32.7 G/DL (ref 31–37)
MCV RBC AUTO: 100.9 FL
MONOCYTES # BLD AUTO: 1.39 X10(3) UL (ref 0.1–1)
MONOCYTES NFR BLD AUTO: 12.9 %
NEUTROPHILS # BLD AUTO: 6.54 X10 (3) UL (ref 1.5–7.7)
NEUTROPHILS # BLD AUTO: 6.54 X10(3) UL (ref 1.5–7.7)
NEUTROPHILS NFR BLD AUTO: 60.5 %
OSMOLALITY SERPL CALC.SUM OF ELEC: 287 MOSM/KG (ref 275–295)
PHOSPHATE SERPL-MCNC: 2.1 MG/DL (ref 2.5–4.9)
PLATELET # BLD AUTO: 186 10(3)UL (ref 150–450)
POTASSIUM SERPL-SCNC: 4 MMOL/L (ref 3.5–5.1)
PROCALCITONIN SERPL-MCNC: <0.02 NG/ML (ref ?–0.16)
PROT PATTERN CSF ELPH-IMP: 55.2 MG/DL (ref 15–45)
RBC # BLD AUTO: 4.55 X10(6)UL
RBC # CSF: 5 /CUMM (ref ?–1)
SODIUM SERPL-SCNC: 138 MMOL/L (ref 136–145)
TOTAL VOLUME CSF: 22 ML
TUBE # CSF: 3
TURBIDITY CSF QL: CLEAR
VDRL CSF QL: NONREACTIVE
WBC # BLD AUTO: 10.8 X10(3) UL (ref 4–11)
WBC # FLD MANUAL: 1 /CUMM (ref 0–5)

## 2021-09-07 PROCEDURE — 99232 SBSQ HOSP IP/OBS MODERATE 35: CPT | Performed by: OTHER

## 2021-09-07 PROCEDURE — 99232 SBSQ HOSP IP/OBS MODERATE 35: CPT | Performed by: HOSPITALIST

## 2021-09-07 PROCEDURE — 62328 DX LMBR SPI PNXR W/FLUOR/CT: CPT | Performed by: OTHER

## 2021-09-07 PROCEDURE — 009U3ZX DRAINAGE OF SPINAL CANAL, PERCUTANEOUS APPROACH, DIAGNOSTIC: ICD-10-PCS | Performed by: RADIOLOGY

## 2021-09-07 PROCEDURE — B01B1ZZ FLUOROSCOPY OF SPINAL CORD USING LOW OSMOLAR CONTRAST: ICD-10-PCS | Performed by: RADIOLOGY

## 2021-09-07 RX ORDER — CHOLECALCIFEROL (VITAMIN D3) 125 MCG
500 CAPSULE ORAL DAILY
Status: DISCONTINUED | OUTPATIENT
Start: 2021-09-07 | End: 2021-09-15

## 2021-09-07 NOTE — PLAN OF CARE
Wife notified about soft wrist restraints placed on patient. Wife does not want patient be given Ativan, per wife patient becomes more agitated with MD Owen paged. Respiratory therapist called to place bipap on patient for the night.

## 2021-09-07 NOTE — OCCUPATIONAL THERAPY NOTE
Attempted to see patient this AM and PM for therapy services. This morning patient was having lumbar puncture and now patient is on bedrest and must lay flat until 3p.  Per RN, patient is still displaying significant cognitive deficits that inhibit his abil

## 2021-09-07 NOTE — PROGRESS NOTES
[facility]    Progress Note    Ruben Kaye Sr. Patient Status:  Inpatient    1955 MRN [id number]   Location [facility] 5SW/SE Attending Keiko Morrison MD   Hosp Day # 10 PCP PHYSICIAN NONSTAFF       Subjective:   Yusuf Grimes Daily  •  amLODIPine (NORVASC) tab 5 mg, 5 mg, Oral, Daily  •  [Held by provider] aspirin chewable tab 81 mg, 81 mg, Oral, Daily  •  atorvastatin (LIPITOR) tab 40 mg, 40 mg, Oral, Nightly  •  [Held by provider] clopidogrel (PLAVIX) tab 75 mg, 75 mg, Oral, medical clearance, await LP results      Results:     Recent Labs   Lab 09/03/21  0519 09/05/21  0620 09/07/21  0614   RBC 4.26 4.48 4.55   HGB 14.0 14.7 15.0   HCT 43.5 45.7 45.9   .1* 102.0* 100.9*   MCH 32.9 32.8 33.0   MCHC 32.2 32.2 32.7   RDW

## 2021-09-07 NOTE — PROGRESS NOTES
[facility]  [address] [facility], [address] [place] [address]  [phone]          INPATIENT NEUROLOGY   FOLLOW UP CONSULT NOTE       [facility]    Report of Consultation    Yobany García Sr. Patient Status:  Infranny brain with no acute infarct or hemorrhage, motion degraded  –Negative Toxoplasma antibodies, ammonia, B12 deficiency  –CSF 9/7/2021: 1WBC, [address] Colorless/clear. Culture negative. Glucose 59. Protein 55.2 (mildly elevated). Negative Cyptococcal antigen.

## 2021-09-07 NOTE — PLAN OF CARE
Patient very agitated around midnight, trying to get OOB, high fall risk, combative with staff. Security called, MD notified. Ativan given as ordered, soft restraints placed on wrists. Restless all night, confused and hallucinating.  Trying to get OOB, octavio results as appropriate  - Fluid restriction as ordered  - Instruct patient on fluid and nutrition restrictions as appropriate  Outcome: Progressing     Problem: SKIN/TISSUE INTEGRITY - ADULT  Goal: Skin integrity remains intact  Description: INTERVENTIONS perfusion and minimize risk of hemorrhage  - Monitor temperature, glucose, and sodium.  Initiate appropriate interventions as ordered  Outcome: Not Progressing  Goal: Achieves maximal functionality and self care  Description: INTERVENTIONS  - Monitor jacquelino

## 2021-09-07 NOTE — PHYSICAL THERAPY NOTE
Attempted follow up PT treatment this date. This morning patient was having lumbar puncture and now patient is on bedrest and must lay flat until 3p.  Per RN, patient is still displaying significant cognitive deficits that inhibit his ability to safely part

## 2021-09-07 NOTE — PLAN OF CARE
No complaints of pain. Lumbar puncture complete, HOB flat for 4 hours. Bandaid intact at site. Morning meds given later, MD aware. Q2 restraint assessment completed per protocol.  Camera monitoring in place, bed in lowest position, and safety precautions i SKIN/TISSUE INTEGRITY - ADULT  Goal: Skin integrity remains intact  Description: INTERVENTIONS  - Assess and document risk factors for pressure ulcer development  - Assess and document skin integrity  - Monitor for areas of redness and/or skin breakdown  - elimination needs   - Reorient and redirection as needed  - Assess for the need to continue restraints  Outcome: Progressing     Problem: Delirium  Goal: Minimize duration of delirium  Description: Interventions:  - Encourage use of hearing aids, eye glass

## 2021-09-07 NOTE — CONSULTS
SUBJECTIVE:  Patient seen this Am at bedside. Patient to undergo LP since plavix has now been held for 5 days. Patient has been agitated and required ativan for agitation. Patient has undergone EEG which revealed generalized slowing.  MRI brain did not reve with RW for support and with CGA , with RW for support, cues needed for RW placement        LUNG: normal Breath Sounds, no wheezes, no rhonchi, no tachypnea. CARDIOVASCULAR: Regular rate rhythm, Dorsalis pedis pulses normal bilat.   ABDOMEN: normal Breath skilled therapy, at least 5 days a week, in one or more of the functional domains documented above. 24 hour rehabilitation nursing care also beneficial for medication management, pressure sore prevention, bladder and bowel management.  Physiatric medic

## 2021-09-08 ENCOUNTER — APPOINTMENT (OUTPATIENT)
Dept: GENERAL RADIOLOGY | Facility: HOSPITAL | Age: 66
DRG: 097 | End: 2021-09-08
Attending: HOSPITALIST
Payer: MEDICARE

## 2021-09-08 LAB
ALBUMIN SERPL-MCNC: 3.6 G/DL (ref 3.4–5)
ALBUMIN/GLOB SERPL: 1.1 {RATIO} (ref 1–2)
ALP LIVER SERPL-CCNC: 68 U/L
ALT SERPL-CCNC: 48 U/L
ANION GAP SERPL CALC-SCNC: 4 MMOL/L (ref 0–18)
AST SERPL-CCNC: 17 U/L (ref 15–37)
BASOPHILS # BLD AUTO: 0.07 X10(3) UL (ref 0–0.2)
BASOPHILS NFR BLD AUTO: 0.7 %
BILIRUB SERPL-MCNC: 0.8 MG/DL (ref 0.1–2)
BUN BLD-MCNC: 19 MG/DL (ref 7–18)
BUN/CREAT SERPL: 23.2 (ref 10–20)
CALCIUM BLD-MCNC: 9.4 MG/DL (ref 8.5–10.1)
CHLORIDE SERPL-SCNC: 103 MMOL/L (ref 98–112)
CO2 SERPL-SCNC: 31 MMOL/L (ref 21–32)
CREAT BLD-MCNC: 0.82 MG/DL
CRYPTOCOCCUS NEOFORMANS GATTII BY PCR: NOT DETECTED
CYTOMEGALVIRUS BY PCR: NOT DETECTED
DEPRECATED RDW RBC AUTO: 46.7 FL (ref 35.1–46.3)
ENTEROVIRUS BY PCR: NOT DETECTED
EOSINOPHIL # BLD AUTO: 0.11 X10(3) UL (ref 0–0.7)
EOSINOPHIL NFR BLD AUTO: 1.1 %
ERYTHROCYTE [DISTWIDTH] IN BLOOD BY AUTOMATED COUNT: 12.8 % (ref 11–15)
ESCHERICHIA COLI K1 BY PCR: NOT DETECTED
GLOBULIN PLAS-MCNC: 3.3 G/DL (ref 2.8–4.4)
GLUCOSE BLD-MCNC: 115 MG/DL (ref 70–99)
HAEMOPHILUS INFLUENZAE BY PCR: NOT DETECTED
HAV IGM SER QL: 2 MG/DL (ref 1.6–2.6)
HCT VFR BLD AUTO: 46.2 %
HERPES SIMPLEX VIRUS 1 BY PCR: NOT DETECTED
HERPES SIMPLEX VIRUS 2 BY PCR: NOT DETECTED
HGB BLD-MCNC: 15.2 G/DL
HUMAN HERPESVIRUS 6 BY PCR: NOT DETECTED
HUMAN PARECHOVIRUS BY PCR: NOT DETECTED
IMM GRANULOCYTES # BLD AUTO: 0.15 X10(3) UL (ref 0–1)
IMM GRANULOCYTES NFR BLD: 1.4 %
LISTERIA MONOCYTOGENES BY PCR: NOT DETECTED
LYMPHOCYTES # BLD AUTO: 2.12 X10(3) UL (ref 1–4)
LYMPHOCYTES NFR BLD AUTO: 20.4 %
M PROTEIN MFR SERPL ELPH: 6.9 G/DL (ref 6.4–8.2)
MCH RBC QN AUTO: 32.6 PG (ref 26–34)
MCHC RBC AUTO-ENTMCNC: 32.9 G/DL (ref 31–37)
MCV RBC AUTO: 99.1 FL
MONOCYTES # BLD AUTO: 1.33 X10(3) UL (ref 0.1–1)
MONOCYTES NFR BLD AUTO: 12.8 %
NEISSERIA MENINGITIDIS BY PCR: NOT DETECTED
NEUTROPHILS # BLD AUTO: 6.59 X10 (3) UL (ref 1.5–7.7)
NEUTROPHILS # BLD AUTO: 6.59 X10(3) UL (ref 1.5–7.7)
NEUTROPHILS NFR BLD AUTO: 63.6 %
OSMOLALITY SERPL CALC.SUM OF ELEC: 289 MOSM/KG (ref 275–295)
PHOSPHATE SERPL-MCNC: 3.2 MG/DL (ref 2.5–4.9)
PLATELET # BLD AUTO: 205 10(3)UL (ref 150–450)
POTASSIUM SERPL-SCNC: 4.1 MMOL/L (ref 3.5–5.1)
RBC # BLD AUTO: 4.66 X10(6)UL
SODIUM SERPL-SCNC: 138 MMOL/L (ref 136–145)
STREPTOCOCCUS AGALACTIAE BY PCR: NOT DETECTED
STREPTOCOCCUS PNEUMONIAE BY PCR: NOT DETECTED
VARICELLA ZOSTER VIRUS BY PCR: NOT DETECTED
WBC # BLD AUTO: 10.4 X10(3) UL (ref 4–11)

## 2021-09-08 PROCEDURE — 99233 SBSQ HOSP IP/OBS HIGH 50: CPT | Performed by: OTHER

## 2021-09-08 PROCEDURE — 73502 X-RAY EXAM HIP UNI 2-3 VIEWS: CPT | Performed by: HOSPITALIST

## 2021-09-08 PROCEDURE — 99232 SBSQ HOSP IP/OBS MODERATE 35: CPT | Performed by: HOSPITALIST

## 2021-09-08 PROCEDURE — 99231 SBSQ HOSP IP/OBS SF/LOW 25: CPT | Performed by: OTHER

## 2021-09-08 RX ORDER — CHLORPROMAZINE HYDROCHLORIDE 50 MG/1
50 TABLET, FILM COATED ORAL NIGHTLY
Status: DISCONTINUED | OUTPATIENT
Start: 2021-09-08 | End: 2021-09-08

## 2021-09-08 RX ORDER — CHLORPROMAZINE HYDROCHLORIDE 50 MG/1
50 TABLET, FILM COATED ORAL 2 TIMES DAILY
Status: DISCONTINUED | OUTPATIENT
Start: 2021-09-08 | End: 2021-09-15

## 2021-09-08 RX ORDER — DIVALPROEX SODIUM 500 MG/1
500 TABLET, DELAYED RELEASE ORAL 2 TIMES DAILY
Status: DISCONTINUED | OUTPATIENT
Start: 2021-09-08 | End: 2021-09-15

## 2021-09-08 RX ORDER — HALOPERIDOL 5 MG/ML
2 INJECTION INTRAMUSCULAR EVERY 4 HOURS PRN
Status: DISCONTINUED | OUTPATIENT
Start: 2021-09-08 | End: 2021-09-15

## 2021-09-08 RX ORDER — MELATONIN
100 3 TIMES DAILY
Status: DISCONTINUED | OUTPATIENT
Start: 2021-09-08 | End: 2021-09-15

## 2021-09-08 NOTE — PROGRESS NOTES
[facility]    Progress Note    Ruben Lane Sr. Patient Status:  Inpatient    1955 MRN [id number]   Location [facility] 5SW/SE Attending Micha Randolph MD   Hosp Day # 11 PCP PHYSICIAN NONSTAFF       Subjective:   Bebeto Payor 5 mg, 5 mg, Oral, Daily  •  aspirin chewable tab 81 mg, 81 mg, Oral, Daily  •  atorvastatin (LIPITOR) tab 40 mg, 40 mg, Oral, Nightly  •  clopidogrel (PLAVIX) tab 75 mg, 75 mg, Oral, Daily  •  isosorbide mononitrate ER (IMDUR) 24 hr tab 60 mg, 60 mg, Oral, 09/08/21  0522   RBC 4.48 4.55 4.66   HGB 14.7 15.0 15.2   HCT 45.7 45.9 46.2   .0* 100.9* 99.1   MCH 32.8 33.0 32.6   MCHC 32.2 32.7 32.9   RDW 13.2 12.7 12.8   NEPRELIM 6.44 6.54 6.59   WBC 10.9 10.8 10.4   .0 186.0 205.0         Recent Lab

## 2021-09-08 NOTE — PLAN OF CARE
Patient confused, attempting to get out of bed. Patient awake saying nonsensical things, mentioned that he \"need to go downstairs to see the giant octopus\" at one point, hallucinating. IV haldol given x1 so far this shift, with little to no effect.  Light protocol/standard of care  - Consider collaborating with pharmacy to review patient's medication profile  - Implement strategies to promote bladder emptying  Outcome: Progressing     Problem: METABOLIC/FLUID AND ELECTROLYTES - ADULT  Goal: Electrolytes elvira Patient Centered Care  Goal: Patient preferences are identified and integrated in the patient's plan of care  Description: Interventions:  - What would you like us to know as we care for you?   - Provide timely, complete, and accurate information to patien

## 2021-09-08 NOTE — PHYSICAL THERAPY NOTE
PHYSICAL THERAPY TREATMENT NOTE - INPATIENT     Room Number: 555/555-A       Presenting Problem: respiratory failure     Problem List  Principal Problem:    Acute on chronic respiratory failure with hypercapnia (HCC)  Active Problems:    Respiratory acidos Short Form. Research supports that patients with this level of impairment may benefit from acute rehab to optimize functional outcomes.     DISCHARGE RECOMMENDATIONS  PT Discharge Recommendations: Acute rehabilitation     PLAN  PT Treatment Plan: Bed mobil Gait Assistance: Minimum assistance  Distance (ft): 125ft x 2  Assistive Device: Rolling walker  Pattern:  (veering toward L side)  Stoop/Curb Assistance: Minimum assistance; Moderate assistance  Comment : 4 stairs with use of B HR's      Patient End of S

## 2021-09-08 NOTE — PROGRESS NOTES
[facility]  [address] [facility], [address] [place] [address]  [phone]          INPATIENT NEUROLOGY   FOLLOW UP CONSULT NOTE       [facility]    Report of Consultation    Dulce Maria Hung Sr. Patient Status:  Infranny cognitive, behavioral changes and visual hallucinations.  On neurological examination, he was fully oriented other than specific date. He has been impulsive throughout hospital stay.   –Mild generalized slowing on EEG  –MRI brain with no acute infarct or he

## 2021-09-08 NOTE — OCCUPATIONAL THERAPY NOTE
OCCUPATIONAL THERAPY TREATMENT NOTE - INPATIENT        Room Number: 555/555-A           Presenting Problem:  (acute on chronic respiratory failure)    Problem List  Principal Problem:    Acute on chronic respiratory failure with hypercapnia (HCC)  Active P training; Endurance training;Cognitive reorientation    SUBJECTIVE  Pt seen up in chair and agreeable for OT session     OBJECTIVE  Precautions: Bed/chair alarm    WEIGHT BEARING RESTRICTION  Weight Bearing Restriction: None                PAIN ASSESSMENT session/findings; Alarm set; Family present    OT Goals:      Patients self stated goal is: does not state       Patient will complete functional transfer with spv   Comment: Progressing-min-  w/RW     Patient will complete toileting with spv Mod asisst with

## 2021-09-09 ENCOUNTER — APPOINTMENT (OUTPATIENT)
Dept: GENERAL RADIOLOGY | Facility: HOSPITAL | Age: 66
DRG: 097 | End: 2021-09-09
Attending: INTERNAL MEDICINE
Payer: MEDICARE

## 2021-09-09 LAB
ANION GAP SERPL CALC-SCNC: 3 MMOL/L (ref 0–18)
BUN BLD-MCNC: 22 MG/DL (ref 7–18)
BUN/CREAT SERPL: 28.9 (ref 10–20)
CALCIUM BLD-MCNC: 9.4 MG/DL (ref 8.5–10.1)
CHLORIDE SERPL-SCNC: 101 MMOL/L (ref 98–112)
CO2 SERPL-SCNC: 34 MMOL/L (ref 21–32)
CREAT BLD-MCNC: 0.76 MG/DL
GLUCOSE BLD-MCNC: 109 MG/DL (ref 70–99)
OSMOLALITY SERPL CALC.SUM OF ELEC: 290 MOSM/KG (ref 275–295)
POTASSIUM SERPL-SCNC: 4.4 MMOL/L (ref 3.5–5.1)
SODIUM SERPL-SCNC: 138 MMOL/L (ref 136–145)

## 2021-09-09 PROCEDURE — 71045 X-RAY EXAM CHEST 1 VIEW: CPT | Performed by: INTERNAL MEDICINE

## 2021-09-09 PROCEDURE — 99233 SBSQ HOSP IP/OBS HIGH 50: CPT | Performed by: OTHER

## 2021-09-09 PROCEDURE — 99232 SBSQ HOSP IP/OBS MODERATE 35: CPT | Performed by: OTHER

## 2021-09-09 PROCEDURE — 30233S1 TRANSFUSION OF NONAUTOLOGOUS GLOBULIN INTO PERIPHERAL VEIN, PERCUTANEOUS APPROACH: ICD-10-PCS | Performed by: OTHER

## 2021-09-09 PROCEDURE — 99232 SBSQ HOSP IP/OBS MODERATE 35: CPT | Performed by: HOSPITALIST

## 2021-09-09 RX ORDER — ACETAMINOPHEN 325 MG/1
650 TABLET ORAL DAILY
Status: COMPLETED | OUTPATIENT
Start: 2021-09-09 | End: 2021-09-13

## 2021-09-09 RX ORDER — DIPHENHYDRAMINE HCL 25 MG
25 CAPSULE ORAL DAILY
Status: COMPLETED | OUTPATIENT
Start: 2021-09-09 | End: 2021-09-13

## 2021-09-09 NOTE — PROGRESS NOTES
[facility]  [address] [facility], [address] [place] [address]  [phone]          INPATIENT NEUROLOGY   FOLLOW UP CONSULT NOTE       [facility]    Report of Consultation    Elvira Santos Sr. Patient Status:  Infranny hallucinations.  On neurological examination, he more lethargic but quickly awakens, more disoriented making semantic paraphasic errors with dysarthria. He has been impulsive throughout hospital stay.   –Mild generalized slowing on EEG  –MRI brain with no a infusions  –Continue B12 oral supplement as half of patients with true B12 deficiency can have normal or elevated B12 levels.   –Discussed CT brain with wife to investigate his dysarthria but she prefers to hold off today and if persisting by tomorrow, then

## 2021-09-09 NOTE — PROGRESS NOTES
[facility]    Progress Note    Belinda Farias Sr. Patient Status:  Inpatient    1955 MRN [id number]   Location [facility] 5SW/SE Attending Jignesh Jackson MD   Hosp Day # 12 PCP PHYSICIAN NONSTAFF       Subjective:   Anthony Bodily mg, Oral, Daily  •  aspirin chewable tab 81 mg, 81 mg, Oral, Daily  •  atorvastatin (LIPITOR) tab 40 mg, 40 mg, Oral, Nightly  •  clopidogrel (PLAVIX) tab 75 mg, 75 mg, Oral, Daily  •  [Held by provider] isosorbide mononitrate ER (IMDUR) 24 hr tab 60 mg, 6 4.66   HGB 14.7 15.0 15.2   HCT 45.7 45.9 46.2   .0* 100.9* 99.1   MCH 32.8 33.0 32.6   MCHC 32.2 32.7 32.9   RDW 13.2 12.7 12.8   NEPRELIM 6.44 6.54 6.59   WBC 10.9 10.8 10.4   .0 186.0 205.0         Recent Labs   Lab 09/07/21  0614 09/08/21

## 2021-09-09 NOTE — PROGRESS NOTES
[facility] Note: Weight Based Dose Adjustment for: Immune Globulin    Sonali Gastonhailey Sr. is a 78year old patient who has been prescribed Immune Globulin 400 mg/kg every 24 hours based on actual body weight.     Estimated Creatinine Clearance: 97.4 mL/min

## 2021-09-09 NOTE — PROGRESS NOTES
Patient is a 78year old   male with history of HTN, MI, COPD and with a chronic alcohol abuse who presented to the hospital with acute respiratory failure.      Consult Duration     The patient seen for over 35-minute, follow-up evaluatio Lamictal 50 mg twice daily with limited benefit.       Medical History:       Past Medical History  Past Medical History:   Diagnosis Date   • Arthritis    • Atherosclerosis of coronary artery    • Essential hypertension    • Heart attack ([facility] [address]    • Hyperli 75 mg, 75 mg, Oral, Daily  [Held by provider] isosorbide mononitrate ER (IMDUR) 24 hr tab 60 mg, 60 mg, Oral, Daily  metoprolol succinate (Toprol XL) 24 hr tab 50 mg, 50 mg, Oral, BID  Heparin Sodium (Porcine) 5000 UNIT/ML injection 5,000 Units, 5,000 Unit PTP 11.8 11/21/2016    TSH 0.458 08/31/2021    DDIMER 0.40 01/01/2021    MG 2.0 09/08/2021    PHOS 3.2 09/08/2021    TROP <0.045 08/28/2021     11/18/2016    B12 1,297 (H) 08/31/2021         Imaging:  XR LUMBAR PUNCTURE DIAG, INCLD IMG (CPT=62328) to his delirium. Language: Appropriate in structure.   Insight/Judgment: somewhat limited/impaired d/t medical condition and sustained alcohol use    Impression:     Impression:        Delirium due to multiple etiologies (chronic alcoholism, sleep apnea an by PCR      Vdrl, CSF      PTT, Activated      CBC With Differential With Platelet      Renal Function Panel      Toxoplasma Ab IgG/IgM      CBC With Differential With Platelet      Procalcitonin      Basic Metabolic Panel (8)      Magnesium      CBC With

## 2021-09-09 NOTE — PLAN OF CARE
Patient confused, but pleasant at beginning of shift. Vital signs stable. BiPAP placed by respiratory. Then was attempting to get out of bed multiple times. Having hallucinations and attempting to remove BiPAP.  IV haldol given x1 so far this shift, with li needed  - Follow urinary retention protocol/standard of care  - Consider collaborating with pharmacy to review patient's medication profile  - Implement strategies to promote bladder emptying  Outcome: Progressing     Problem: METABOLIC/FLUID AND ELECTROLY devices  Outcome: Progressing     Problem: Patient Centered Care  Goal: Patient preferences are identified and integrated in the patient's plan of care  Description: Interventions:  - What would you like us to know as we care for you?   - Provide timely, c

## 2021-09-09 NOTE — CONSULTS
[address] [facility] NOTE      Patient Name: Kraig Bear   MRN: [id number]   : 1955 CSN: 2 coronary artery    • Essential hypertension    • Heart attack [facility])    • Hyperlipidemia    • Myocardial infarction [facility])     January 2017, treated at [facility].    • Reactive airway disease    • Sleep apnea      Past Surgical History:   Procedure Patrice Tapia gallop, or rub  Abd - soft, nontender  Ext - no edema, pulses full  Neuro -appears oriented.   Psych - cooperative    LABS AND DATA:     Lab Results   Component Value Date    WBC 10.4 09/08/2021    HGB 15.2 09/08/2021    HCT 46.2 09/08/2021    .0 09/ this time so we will not treat as such. I am not sure what the indication for being on Plavix is but he does not need that from a cardiac standpoint. Apparently it had been stopped by Dr. Ayesha Neumann at 1 point but was resumed elsewhere.         Thank you for

## 2021-09-10 ENCOUNTER — APPOINTMENT (OUTPATIENT)
Dept: CV DIAGNOSTICS | Facility: HOSPITAL | Age: 66
DRG: 097 | End: 2021-09-10
Attending: INTERNAL MEDICINE
Payer: MEDICARE

## 2021-09-10 LAB
CHOLEST SMN-MCNC: 128 MG/DL (ref ?–200)
HDLC SERPL-MCNC: 54 MG/DL (ref 40–59)
LDLC SERPL CALC-MCNC: 56 MG/DL (ref ?–100)
NONHDLC SERPL-MCNC: 74 MG/DL (ref ?–130)
TRIGL SERPL-MCNC: 98 MG/DL (ref 30–149)
VLDLC SERPL CALC-MCNC: 14 MG/DL (ref 0–30)
WEST NILE VIRUS AB IGG CSF: 0.05 IV
WEST NILE VIRUS AB IGM CSF: 0 IV

## 2021-09-10 PROCEDURE — 93306 TTE W/DOPPLER COMPLETE: CPT | Performed by: INTERNAL MEDICINE

## 2021-09-10 PROCEDURE — 99232 SBSQ HOSP IP/OBS MODERATE 35: CPT | Performed by: HOSPITALIST

## 2021-09-10 PROCEDURE — 99233 SBSQ HOSP IP/OBS HIGH 50: CPT | Performed by: OTHER

## 2021-09-10 NOTE — OCCUPATIONAL THERAPY NOTE
OCCUPATIONAL THERAPY TREATMENT NOTE - INPATIENT        Room Number: 555/555-A           Presenting Problem:  (acute on chronic respiratory failure)    Problem List  Principal Problem:    Acute on chronic respiratory failure with hypercapnia (HCC)  Active P training; Endurance training    SUBJECTIVE  Pt seen up in bed and agreeable for OT session     OBJECTIVE  Precautions: Bed/chair alarm    WEIGHT BEARING RESTRICTION  Weight Bearing Restriction: None                PAIN ASSESSMENT  Ratin  Location:  (gen in chair;Needs met;Call light within reach;RN aware of session/findings; Alarm set    OT Goals:     Patients self stated goal is: does not state       Patient will complete functional transfer with spv   Comment: Progressing-min-  w/RW     Patient will comp

## 2021-09-10 NOTE — PROGRESS NOTES
Patient is a 78year old   male with history of HTN, MI, COPD and with a chronic alcohol abuse who presented to the hospital with acute respiratory failure.    Consult Duration     The patient seen for over 35-minute, follow-up evaluation, ROTATOR CUFF,ACUTE Right approx. 2009       Family History  History reviewed. No pertinent family history.     Social History  Social History    Tobacco Use      Smoking status: Former Smoker        Types: Cigarettes        Quit date: 7/30/2016        Years PRN      ipratropium-albuterol 0.5-2.5 (3) MG/3ML Inhalation Solution, Take 3 mL by nebulization every 6 (six) hours as needed (cough or dyspnea). loratadine 10 MG Oral Tab, Take 10 mg by mouth daily.   Isosorbide Mononitrate ER 60 MG Oral Tablet 24 Hr, Ta pleural effusion and associated compressive atelectasis, with or without associated pneumonia. 2. Postthoracotomy chest with stable cardiomegaly.     Dictated by (CST): Dara Agustin MD on 9/09/2021 at 5:34 PM     Finalized by (CST): Dara Agustin MD o failure). Episodic mood disorder.   Acute on chronic respiratory failure with hypercapnia (HCC)  Respiratory acidosis  Metabolic encephalopathy      The patient for first time demonstrating improvement in his clarity of his thought and denying any hallucin 14-3-3 Protein Tau Total, CSF      CBC With Differential With Platelet      Renal Function Panel      Magnesium      Procalcitonin      Phosphorus      Arbovirus/West Nile Ab Pnl,Csf      CBC With Differential With Platelet      Comp Metabolic Panel (14)

## 2021-09-10 NOTE — PLAN OF CARE
Up in chair,  ambulated in maciel with therapy. Day 2 of IVIG. Family says his thought process is clearer and denies any hallucinations. Sleeping well.    Problem: Delirium  Goal: Minimize duration of delirium  Description: Interventions:  - Encourage use rhythm and assess patient for signs and symptoms of electrolyte imbalances  - Administer electrolyte replacement as ordered  - Monitor response to electrolyte replacements, including rhythm and repeat lab results as appropriate  - Fluid restriction as orde cultural backgrounds into the planning and delivery of care  - Encourage patient/family to participate in care and decision-making at the level they choose  - Honor patient and family perspectives and choices  Outcome: Progressing     Problem: Patient/Fami

## 2021-09-10 NOTE — PROGRESS NOTES
Patient is a 78year old   male with history of HTN, MI, COPD and with a chronic alcohol abuse who presented to the hospital with acute respiratory failure.    Consult Duration     The patient seen for over 25-minute, follow-up evaluation, family history. Social History  Social History    Tobacco Use      Smoking status: Former Smoker        Types: Cigarettes        Quit date: 2016        Years since quittin.1      Smokeless tobacco: Never Used    Alcohol use:  Yes      Alcohol/we every 6 (six) hours as needed (cough or dyspnea). loratadine 10 MG Oral Tab, Take 10 mg by mouth daily. Isosorbide Mononitrate ER 60 MG Oral Tablet 24 Hr, Take 60 mg by mouth daily.   lisinopril 5 MG Oral Tab, TK 1 T PO  QD UTD  Cyclobenzaprine HCl 10 MG Dictated by (CST): Kelsey Tovar MD on 9/07/2021 at 11:44 AM     Finalized by (CST): Kelsey Tovar MD on 9/07/2021 at 11:48 AM          XR CHEST AP PORTABLE  (CPT=71045)    Result Date: 9/9/2021  CONCLUSION:  1.  Elevation left hemidiaphragm wit delirium. Language: Appropriate in structure.   Insight/Judgment: somewhat limited/impaired d/t medical condition and sustained alcohol use    Impression:     Impression:        Delirium due to multiple etiologies (chronic alcoholism, sleep apnea and respi by PCR      Vdrl, CSF      PTT, Activated      CBC With Differential With Platelet      Renal Function Panel      Toxoplasma Ab IgG/IgM      CBC With Differential With Platelet      Procalcitonin      Basic Metabolic Panel (8)      Magnesium      CBC With

## 2021-09-10 NOTE — PHYSICAL THERAPY NOTE
PHYSICAL THERAPY TREATMENT NOTE - INPATIENT     Room Number: 555/555-A       Presenting Problem: respiratory failure     Problem List  Principal Problem:    Acute on chronic respiratory failure with hypercapnia (HCC)  Active Problems:    Respiratory acidos training;Stair training;Transfer training;Balance training    SUBJECTIVE  \"I used to kill chickens! \"    OBJECTIVE  Precautions: Bed/chair alarm    WEIGHT BEARING RESTRICTION  Weight Bearing Restriction: None                PAIN ASSESSMENT   Ratin Goals to be met by: 9/10/21  Patient Goal Patient's self-stated goal is: to go home   Goal #1 Patient is able to demonstrate supine - sit EOB @ level: independent      Goal #1   Current Status CG   Goal #2 Patient is able to demonstrate transfers Sit to/

## 2021-09-10 NOTE — PLAN OF CARE
Patient asleep from 7 am till 1230 pm today. More drowsy today but pleasant and cooperative when patient was awake. Sat in chair for lunch and dinner.    Patients sister at bedside refused IVIG for patient that was ordered by neuro till echo and CXR complet METABOLIC/FLUID AND ELECTROLYTES - ADULT  Goal: Electrolytes maintained within normal limits  Description: INTERVENTIONS:  - Monitor labs and rhythm and assess patient for signs and symptoms of electrolyte imbalances  - Administer electrolyte replacement a

## 2021-09-10 NOTE — PROGRESS NOTES
[facility]     Cardiology Progress Note    Subjective:  No chest pain or shortness of breath. States he feels much better today.      Objective:  /70 (BP Location: Right arm)   Pulse 76   Temp 97.2 °F (36.2 °C) (Oral)   Resp 21   Wt 20 more alert today recognizing me. No chest pain or shortness of breath. Echo pending to reassess cardiac function. If LV dysfunction persists consider resuming ACE or ARB. Patient on isosorbide for history of collateralized vessels which can continue.

## 2021-09-10 NOTE — PROGRESS NOTES
[facility]  [address] [facility], [address] [place] [address]  [phone]          INPATIENT NEUROLOGY   FOLLOW UP CONSULT NOTE       [facility]    Report of Consultation    Remy Josue Sr. Patient Status:  Infranny Toxoplasma antibodies, ammonia, B12 deficiency  –CSF 9/7/2021: 1WBC, 5RBC. Colorless/clear. Culture negative. Glucose 59. Protein 55.2 (mildly elevated). Negative Cyptococcal antigen.   Negative VDRL.   Negative CMV, E. coli, enterovirus, H. influenzae, hum prepared using [employer] [facility] [employer] voice recognition dictation software and as a result, errors may occur. When identified, these errors have been corrected.  While every attempt is made to correct errors during dictation, discrepancies may still exist    S K Sy

## 2021-09-10 NOTE — PROGRESS NOTES
[facility]    Progress Note    Alaina Dorsey Sr. Patient Status:  Inpatient    1955 MRN [id number]   Location [facility] 5SW/SE Attending Satish Garcia MD   Hosp Day # 15 PCP PHYSICIAN NONSTAFF       Subjective:   Saumya Cunha Intravenous, Q6H PRN  •  ipratropium-albuterol (DUONEB) nebulizer solution 3 mL, 3 mL, Nebulization, 2 times daily  •  multivitamin (ADULT) tab 1 tablet, 1 tablet, Oral, Daily  •  ipratropium-albuterol (DUONEB) nebulizer solution 3 mL, 3 mL, Nebulization, a significant component of heart failure.  -resolved  -transitioned to oral steroids-> now stopped  -cont Duo nebs  -BiPAP at night and when asleep  -Pulmonology following    History of systolic congestive heart failure  Stable  - cardiology consulted  - e

## 2021-09-11 PROCEDURE — 99233 SBSQ HOSP IP/OBS HIGH 50: CPT | Performed by: HOSPITALIST

## 2021-09-11 PROCEDURE — 99231 SBSQ HOSP IP/OBS SF/LOW 25: CPT | Performed by: OTHER

## 2021-09-11 RX ORDER — LORAZEPAM 2 MG/ML
0.5 INJECTION INTRAMUSCULAR ONCE
Status: COMPLETED | OUTPATIENT
Start: 2021-09-11 | End: 2021-09-11

## 2021-09-11 RX ORDER — MAGNESIUM HYDROXIDE/ALUMINUM HYDROXICE/SIMETHICONE 120; 1200; 1200 MG/30ML; MG/30ML; MG/30ML
30 SUSPENSION ORAL 4 TIMES DAILY PRN
Status: DISCONTINUED | OUTPATIENT
Start: 2021-09-11 | End: 2021-09-15

## 2021-09-11 NOTE — PHYSICAL THERAPY NOTE
PHYSICAL THERAPY TREATMENT NOTE - INPATIENT     Room Number: 555/555-A       Presenting Problem: respiratory failure     Problem List  Principal Problem:    Acute on chronic respiratory failure with hypercapnia (HCC)  Active Problems:    Respiratory acidos education; Family education; Gait training; Neuromuscular re-educate;  Coordination; Range of motion; Strengthening; Stoop training; Stair training; Transfer training; Balance training    SUBJECTIVE  \"I feel pretty dizzy\"    OBJECTIVE  Precautions: Bed/ch by: 9/10/21  Patient Goal Patient's self-stated goal is: to go home   Goal #1 Patient is able to demonstrate supine - sit EOB @ level: independent      Goal #1   Current Status NT (pt sitting in bedside chair upon arrival)   Goal #2 Patient is able to demo

## 2021-09-11 NOTE — PROGRESS NOTES
[place] [facility]  Hospitalist Progress Note     Sue Prince Sr. Patient Status:  Inpatient    1955  78year old CSN [id number]   Location 555/555-A Attending Dax Hernandez,  [facility] Se Day # 15 PCP PHYSICIAN NONSTAFF     Assessment & Plan: diaphoresis. Psych: Normal mood and affect. Calm, cooperative    Labs:  Recent Labs   Lab 09/05/21  0620 09/07/21  0614 09/08/21  0522   RBC 4.48 4.55 4.66   HGB 14.7 15.0 15.2   HCT 45.7 45.9 46.2   .0* 100.9* 99.1   MCH 32.8 33.0 32.6   MCHC 32. coordinating care in the form of educating pt/family and d/w consultants and staff. Most of the time spent discussing plan for continued IVIG. We discussed his echocardiogram and chest x-ray results.   Discussed plan to mobilize a bit more today if tolerat Ilumya Counseling: I discussed with the patient the risks of tildrakizumab including but not limited to immunosuppression, malignancy, posterior leukoencephalopathy syndrome, and serious infections.  The patient understands that monitoring is required including a PPD at baseline and must alert us or the primary physician if symptoms of infection or other concerning signs are noted.

## 2021-09-11 NOTE — PROGRESS NOTES
[facility]     Cardiology Progress Note    Subjective:  No chest pain or shortness of breath. Continues to feel better.  Still a bit confused this AM.    Objective:  /60 (BP Location: Right arm)   Pulse 62   Temp 97.6 °F (36.4 °C) (Axi verbalize improvement daily. LUI Todd  9/11/2021  6:31 AM    I saw and examined the patient and agree with the assessment and plan. Patient is awake alert and conversant. Oriented x 2.  On exam well appearing, lungs scant wheezes bilaterally,

## 2021-09-12 ENCOUNTER — APPOINTMENT (OUTPATIENT)
Dept: CT IMAGING | Facility: HOSPITAL | Age: 66
DRG: 097 | End: 2021-09-12
Attending: Other
Payer: MEDICARE

## 2021-09-12 LAB
ANION GAP SERPL CALC-SCNC: 4 MMOL/L (ref 0–18)
BASOPHILS # BLD AUTO: 0.04 X10(3) UL (ref 0–0.2)
BASOPHILS NFR BLD AUTO: 0.9 %
BUN BLD-MCNC: 11 MG/DL (ref 7–18)
BUN/CREAT SERPL: 17.2 (ref 10–20)
CALCIUM BLD-MCNC: 9.1 MG/DL (ref 8.5–10.1)
CHLORIDE SERPL-SCNC: 105 MMOL/L (ref 98–112)
CO2 SERPL-SCNC: 32 MMOL/L (ref 21–32)
CREAT BLD-MCNC: 0.64 MG/DL
DEPRECATED RDW RBC AUTO: 47.8 FL (ref 35.1–46.3)
EOSINOPHIL # BLD AUTO: 0.12 X10(3) UL (ref 0–0.7)
EOSINOPHIL NFR BLD AUTO: 2.7 %
ERYTHROCYTE [DISTWIDTH] IN BLOOD BY AUTOMATED COUNT: 13.1 % (ref 11–15)
ERYTHROCYTE [SEDIMENTATION RATE] IN BLOOD: 121 MM/HR
GLUCOSE BLD-MCNC: 91 MG/DL (ref 70–99)
HCT VFR BLD AUTO: 43.9 %
HCYS SERPL-SCNC: 6.9 UMOL/L (ref 3.2–10.7)
HGB BLD-MCNC: 14.7 G/DL
IMM GRANULOCYTES # BLD AUTO: 0.03 X10(3) UL (ref 0–1)
IMM GRANULOCYTES NFR BLD: 0.7 %
LYMPHOCYTES # BLD AUTO: 1.12 X10(3) UL (ref 1–4)
LYMPHOCYTES NFR BLD AUTO: 25.4 %
MCH RBC QN AUTO: 33.2 PG (ref 26–34)
MCHC RBC AUTO-ENTMCNC: 33.5 G/DL (ref 31–37)
MCV RBC AUTO: 99.1 FL
MONOCYTES # BLD AUTO: 0.58 X10(3) UL (ref 0.1–1)
MONOCYTES NFR BLD AUTO: 13.2 %
NEUTROPHILS # BLD AUTO: 2.52 X10 (3) UL (ref 1.5–7.7)
NEUTROPHILS # BLD AUTO: 2.52 X10(3) UL (ref 1.5–7.7)
NEUTROPHILS NFR BLD AUTO: 57.1 %
OSMOLALITY SERPL CALC.SUM OF ELEC: 291 MOSM/KG (ref 275–295)
PLATELET # BLD AUTO: 155 10(3)UL (ref 150–450)
POTASSIUM SERPL-SCNC: 4.4 MMOL/L (ref 3.5–5.1)
RBC # BLD AUTO: 4.43 X10(6)UL
SODIUM SERPL-SCNC: 141 MMOL/L (ref 136–145)
WBC # BLD AUTO: 4.4 X10(3) UL (ref 4–11)

## 2021-09-12 PROCEDURE — 99233 SBSQ HOSP IP/OBS HIGH 50: CPT | Performed by: HOSPITALIST

## 2021-09-12 PROCEDURE — 74177 CT ABD & PELVIS W/CONTRAST: CPT | Performed by: OTHER

## 2021-09-12 PROCEDURE — 99233 SBSQ HOSP IP/OBS HIGH 50: CPT | Performed by: OTHER

## 2021-09-12 PROCEDURE — 71260 CT THORAX DX C+: CPT | Performed by: OTHER

## 2021-09-12 RX ORDER — LISINOPRIL 2.5 MG/1
2.5 TABLET ORAL DAILY
Status: DISCONTINUED | OUTPATIENT
Start: 2021-09-12 | End: 2021-09-15

## 2021-09-12 NOTE — PROGRESS NOTES
[place] [facility] - [facility]  Hospitalist Progress Note     Nahid Harrison Sr. Patient Status:  Inpatient    1955  78year old CSN [id number]   Location 555/555-A Attending Leopoldo Bruns, [facility] Se Day # 15 PCP PHYSICIAN NONSTAFF     Assessment & Plan: RBC 4.55 4.66 4.43   HGB 15.0 15.2 14.7   HCT 45.9 46.2 43.9   .9* 99.1 99.1   MCH 33.0 32.6 33.2   MCHC 32.7 32.9 33.5   RDW 12.7 12.8 13.1   NEPRELIM 6.54 6.59 2.52   WBC 10.8 10.4 4.4   .0 205.0 155.0     Recent Labs   Lab 09/07/21  7244 Most of the time spent discussing plan for continued IVIG. We discussed his echocardiogram and chest x-ray results.   Discussed plan to mobilize a bit more today if tolerated  Sheridan De La Rosa MD

## 2021-09-12 NOTE — PROGRESS NOTES
Brief neurology pn    Oriented except to yr and date said it was 9/12.   Follows commands  No acalculia  questionable R/L confusion  Subtle r pronator drift     A/P  Cont IVIG    Full pn to follow

## 2021-09-12 NOTE — PROGRESS NOTES
[facility]  [address]  [phone]        INPATIENT NEUROLOGY   FOLLOW UP PROGRESS NOTE  [address] Sr. Patient Status:  Inpatient     1955 MRN [id number] Diagnostics:  1. Paraneoplastic encephalitis panel in the serum and CSF  2. Repeat MRI in 1 to 2 weeks.   Treponema pallidum screening  Rheumatological screen: ESR, SANTOSH, CRP; given that he is already been treated with IVIG is unclear that his ESR and CRP Mental Status: Alert and attentive. Pleasant and cooperative. Location: [facility] [place] [facility]: Summer. Month: September. He did not know the date.  When asked to read the time of the clock he said it was 3:20 PM . It was actually 5:15 PM; he transposed are symmetric. As expected their dominant hand is slightly faster. Leg Drift: none       Asterixis: (+) asterixis noted.    Tremor:       Reflexes:    C5 C6 C7  L4 S1   R    0 0   L    0 0      Nonsustained clonus: Absent   Sustained clonus: Absent   - ALT 48 09/08/2021    PTT 27.7 08/31/2021    INR 0.9 11/21/2016    PTP 11.8 11/21/2016    TSH 0.458 08/31/2021    DDIMER 0.40 01/01/2021    ESRML 121 (H) 09/12/2021    MG 2.0 09/08/2021    PHOS 3.2 09/08/2021    TROP <0.045 08/28/2021     11/18/2016 an independent visualization of MRI brain  Imaging revealed:   Reviewed MRI. Agree with radiology read. There is significant motion artifact.   There is no obvious mesial temporal lobe hyperintensities on T2  FLAIR    Education/Instructions given to: abiola

## 2021-09-12 NOTE — PROGRESS NOTES
[facility]  [address]  [phone]        INPATIENT NEUROLOGY   FOLLOW UP PROGRESS NOTE  [address] [facility] [address] . Patient Status:  Inpatient     1955 MRN [id number] screen: ESR, SANTOSH, CRP; given that he is already been treated with IVIG is unclear that his ESR and CRP will be of any value.   HIV  Cancer screen (SPEP, serum immunoelectrophoresis, cancer antigen 125)  Additional rheumatological testing, c-ANCA, p-ANCA, DS pathological asymmetry again. He is wearing a mask. Hearing grossly intact. Tongue midline. No atrophy or fasiculations of the tongue noted. Palate and uvula elevate symmetrically. Shoulder shrug symmetric.    - Motor:  normal tone, normal bulk.  No int acetaminophen    Infusions:          Results:   Laboratory Data:  Lab Results   Component Value Date    WBC 10.4 09/08/2021    HGB 15.2 09/08/2021    HCT 46.2 09/08/2021    .0 09/08/2021    CREATSERUM 0.76 09/09/2021    BUN 22 (H) 09/09/2021    NA 1 Learning:None  Content: Refer to note above. Evaluation/Outcome: Verbalized understanding and Demonstrated understanding    Disclaimer: This record was dictated using [address] [place].  There may be errors due to voice recognition problems that were not re

## 2021-09-12 NOTE — PLAN OF CARE
Patient was sleeping at beginning of shift, but once patient woke up he was confused, agitated, and attempted to get out of bed many times. Even with verbal redirection and reorientation, patient became increasingly agitated and combative with staff.  Cayden Almanzar AND ELECTROLYTES - ADULT  Goal: Electrolytes maintained within normal limits  Description: INTERVENTIONS:  - Monitor labs and rhythm and assess patient for signs and symptoms of electrolyte imbalances  - Administer electrolyte replacement as ordered  - Mon timely, complete, and accurate information to patient/family  - Incorporate patient and family knowledge, values, beliefs, and cultural backgrounds into the planning and delivery of care  - Encourage patient/family to participate in care and decision-mushtaq

## 2021-09-12 NOTE — PROGRESS NOTES
[facility]     Cardiology Progress Note    Subjective:  No chest pain or shortness of breath. [address] bipap.     Objective:  /75 (BP Location: Right arm)   Pulse 69   Temp 98 °F (36.7 °C) (Axillary)   Resp 18   Wt 208 lb 1.8 oz (94.4 kg EZEKIEL.     Krys Erickson, APN  9/12/2021  7:04 AM    I saw and examined the patient and agree with the assessment and plan. Patient still mildly disoriented, denied any complaints. On exam on BIPAP this morning, lungs CTAB, heart RRR, no edema.  Clinically eu

## 2021-09-13 LAB
ANION GAP SERPL CALC-SCNC: 5 MMOL/L (ref 0–18)
BASOPHILS # BLD AUTO: 0.05 X10(3) UL (ref 0–0.2)
BASOPHILS NFR BLD AUTO: 1.2 %
BUN BLD-MCNC: 11 MG/DL (ref 7–18)
BUN/CREAT SERPL: 20.8 (ref 10–20)
CALCIUM BLD-MCNC: 8.9 MG/DL (ref 8.5–10.1)
CHLORIDE SERPL-SCNC: 102 MMOL/L (ref 98–112)
CO2 SERPL-SCNC: 29 MMOL/L (ref 21–32)
CREAT BLD-MCNC: 0.53 MG/DL
DEPRECATED RDW RBC AUTO: 48.6 FL (ref 35.1–46.3)
EOSINOPHIL # BLD AUTO: 0.13 X10(3) UL (ref 0–0.7)
EOSINOPHIL NFR BLD AUTO: 3.2 %
ERYTHROCYTE [DISTWIDTH] IN BLOOD BY AUTOMATED COUNT: 13.3 % (ref 11–15)
GLUCOSE BLD-MCNC: 78 MG/DL (ref 70–99)
HCT VFR BLD AUTO: 41.1 %
HGB BLD-MCNC: 13.6 G/DL
IMM GRANULOCYTES # BLD AUTO: 0.04 X10(3) UL (ref 0–1)
IMM GRANULOCYTES NFR BLD: 1 %
LYMPHOCYTES # BLD AUTO: 1.3 X10(3) UL (ref 1–4)
LYMPHOCYTES NFR BLD AUTO: 31.6 %
MAGNESIUM SERPL-MCNC: 1.8 MG/DL (ref 1.6–2.6)
MCH RBC QN AUTO: 32.6 PG (ref 26–34)
MCHC RBC AUTO-ENTMCNC: 33.1 G/DL (ref 31–37)
MCV RBC AUTO: 98.6 FL
MONOCYTES # BLD AUTO: 0.68 X10(3) UL (ref 0.1–1)
MONOCYTES NFR BLD AUTO: 16.5 %
NEUTROPHILS # BLD AUTO: 1.91 X10 (3) UL (ref 1.5–7.7)
NEUTROPHILS # BLD AUTO: 1.91 X10(3) UL (ref 1.5–7.7)
NEUTROPHILS NFR BLD AUTO: 46.5 %
NUCLEAR IGG TITR SER IF: POSITIVE {TITER}
OSMOLALITY SERPL CALC.SUM OF ELEC: 280 MOSM/KG (ref 275–295)
PLATELET # BLD AUTO: 129 10(3)UL (ref 150–450)
POTASSIUM SERPL-SCNC: 4.2 MMOL/L (ref 3.5–5.1)
RBC # BLD AUTO: 4.17 X10(6)UL
SODIUM SERPL-SCNC: 136 MMOL/L (ref 136–145)
T PALLIDUM AB SER QL: NEGATIVE
WBC # BLD AUTO: 4.1 X10(3) UL (ref 4–11)

## 2021-09-13 PROCEDURE — 99232 SBSQ HOSP IP/OBS MODERATE 35: CPT | Performed by: HOSPITALIST

## 2021-09-13 PROCEDURE — 99233 SBSQ HOSP IP/OBS HIGH 50: CPT | Performed by: OTHER

## 2021-09-13 RX ORDER — MAGNESIUM OXIDE 400 MG (241.3 MG MAGNESIUM) TABLET
400 TABLET ONCE
Status: COMPLETED | OUTPATIENT
Start: 2021-09-13 | End: 2021-09-13

## 2021-09-13 RX ORDER — ACETAMINOPHEN 325 MG/1
650 TABLET ORAL EVERY 6 HOURS PRN
Status: DISCONTINUED | OUTPATIENT
Start: 2021-09-13 | End: 2021-09-15

## 2021-09-13 RX ORDER — GABAPENTIN 100 MG/1
100 CAPSULE ORAL 3 TIMES DAILY
Status: DISCONTINUED | OUTPATIENT
Start: 2021-09-13 | End: 2021-09-15

## 2021-09-13 NOTE — PLAN OF CARE
Problem: Delirium  Goal: Minimize duration of delirium  Description: Interventions:  - Encourage use of hearing aids, eye glasses  - Promote highest level of mobility daily  - Provide frequent reorientation  - Promote wakefulness i.e. lights on, blinds o electrolyte replacements, including rhythm and repeat lab results as appropriate  - Fluid restriction as ordered  - Instruct patient on fluid and nutrition restrictions as appropriate  Outcome: Progressing     Problem: SKIN/TISSUE INTEGRITY - ADULT  Goal: care  Description: INTERVENTIONS  - Monitor swallowing and airway patency with patient fatigue and changes in neurological status  - Encourage and assist patient to increase activity and self care with guidance from PT/OT  - Encourage visually impaired, he

## 2021-09-13 NOTE — DIETARY NOTE
Nutrition Re-screen    Pt seen by RD d/t LOS. Pt not at nutrition risk. Pt with good appetite and PO intake >65%, however, pt does not like the food at the hospital. Encouraged wife to bring food from home as long as not high in sodium.  No n/v reported per

## 2021-09-13 NOTE — PLAN OF CARE
No acute changes during shift. Wife present at bedside. IVIG given. Plan is for patient to discharge to [facility] when medically ready. Safety measures are in place.         Problem: Delirium  Goal: Minimize duration of delirium  Description: Interventions Monitor labs and rhythm and assess patient for signs and symptoms of electrolyte imbalances  - Administer electrolyte replacement as ordered  - Monitor response to electrolyte replacements, including rhythm and repeat lab results as appropriate  - Fluid re beliefs, and cultural backgrounds into the planning and delivery of care  - Encourage patient/family to participate in care and decision-making at the level they choose  - Honor patient and family perspectives and choices  Outcome: Progressing     Problem:

## 2021-09-13 NOTE — PROGRESS NOTES
[place] [facility] - [facility]  Hospitalist Progress Note     Gladis Mcnulty Sr. Patient Status:  Inpatient    1955  78year old CSN [id number]   Location 555/555-A Attending Margy Daly, [address] [facility] Se Day # 12 PCP PHYSICIAN NONSTAFF     Assessment & Plan: no mass, no HSM, no rebound/guarding. Neuro: Normal reflexes, CN. Sensory/motor exams grossly normal deficit. MS: No joint effusions. No peripheral edema. Skin: Skin is warm and dry. No rashes, erythema, diaphoresis. Psych: Normal mood and affect. multivitamin  1 tablet Oral Daily   • folic acid  1 mg Oral Daily   • aspirin  81 mg Oral Daily   • atorvastatin  40 mg Oral Nightly   • clopidogrel  75 mg Oral Daily   • [Held by provider] isosorbide mononitrate ER  60 mg Oral Daily   • metoprolol succina

## 2021-09-13 NOTE — PHYSICAL THERAPY NOTE
Attempted PT treatment. Patient sitting in bedside chair with spouse present. Patient lethargic. Spouse requesting defer therapy at this time 2* pt awaiting to receive IVIG treatment. Will reschedule for 9/14.      Thank you,  Ratna Davenport, PT, DPT

## 2021-09-13 NOTE — PROGRESS NOTES
[facility]  [address]  [phone]        INPATIENT NEUROLOGY   FOLLOW UP PROGRESS NOTE  [address] [facility]. Patient Status:  Inpatient     1955 MRN [id number] the CSF  2. Repeat MRI in 1 to 2 weeks. Treponema pallidum screening  Rheumatological screen:  reflex testing after positive SANTOSH; see below.   HIV  Cancer screen (SPEP, serum immunoelectrophoresis, cancer antigen 125)  Additional rheumatological testing, c 102/55, pulse 75, temperature 98 °F (36.7 °C), temperature source Oral, resp.  rate 18, weight 208 lb 1.8 oz (94.4 kg), SpO2 91 %.    09/13/21  0231 09/13/21  0430 09/13/21  0700 09/13/21  0955   BP:  125/66 102/55    BP Location:  Right arm Right arm    Pu noted.   Tremor:       Reflexes:    C5 C6 C7  L4 S1   R    0 0   L    0 0      Nonsustained clonus: Absent   Sustained clonus: Absent   - Sensory:   Light touch:normal   Temperature: Intact  Vibration: Intact;? Inverse gradient.  Reports that it stronger di 192 11/18/2016    B12 1,297 (H) 08/31/2021     Recent Results (from the past 72 hour(s))   Basic Metabolic Panel (8)    Collection Time: 09/12/21  5:41 AM   Result Value Ref Range    Glucose 91 70 - 99 mg/dL    Sodium 141 136 - 145 mmol/L    Potassium 4.4 evaluation of white blood cells shows no significant dysplastic features in neutrophils or monocytes, or atypical features of lymphocytes. No circulating blasts are seen. Circulating microorganisms are not identified. Kenisha Duarte M.D.     Homocysteine Absolute 0.05 0.00 - 0.20 x10(3) uL    Immature Granulocyte Absolute 0.04 0.00 - 1.00 x10(3) uL    Neutrophil % 46.5 %    Lymphocyte % 31.6 %    Monocyte % 16.5 %    Eosinophil % 3.2 %    Basophil % 1.2 %    Immature Granulocyte % 1.0 %         Test result left blank in a completed note should be presumed not to have been done. Thank you.   Rodrigo Haddad D.O.   Vascular & General Neurology  09/13/21  4:32 PM    Total time involved in the care of the patient including face to face time was 35 minutes,

## 2021-09-13 NOTE — PROGRESS NOTES
[facility]     Cardiology Progress Note    Subjective:  No chest pain or shortness of breath.     Objective:  /66 (BP Location: Right arm)   Pulse 61   Temp 97.7 °F (36.5 °C) (Axillary)   Resp 18   Wt 208 lb 1.8 oz (94.4 kg)   SpO2 97% fluid overloaded. Continue ACE/BB/plavix/asa/statin. Stable from a cardiology perspective. Mentation and confusion still concerning. · Cardiology will sign off. Call with any questions or concerns.      LUI Price  9/13/2021  7:27 AM

## 2021-09-14 ENCOUNTER — APPOINTMENT (OUTPATIENT)
Dept: ULTRASOUND IMAGING | Facility: HOSPITAL | Age: 66
DRG: 097 | End: 2021-09-14
Attending: Other
Payer: MEDICARE

## 2021-09-14 ENCOUNTER — APPOINTMENT (OUTPATIENT)
Dept: CT IMAGING | Facility: HOSPITAL | Age: 66
DRG: 097 | End: 2021-09-14
Attending: HOSPITALIST
Payer: MEDICARE

## 2021-09-14 ENCOUNTER — APPOINTMENT (OUTPATIENT)
Dept: GENERAL RADIOLOGY | Facility: HOSPITAL | Age: 66
DRG: 097 | End: 2021-09-14
Attending: HOSPITALIST
Payer: MEDICARE

## 2021-09-14 LAB
ALBUMIN SERPL ELPH-MCNC: 3.44 G/DL (ref 3.75–5.21)
ALBUMIN/GLOB SERPL: 0.74 {RATIO} (ref 1–2)
ALPHA1 GLOB SERPL ELPH-MCNC: 0.37 G/DL (ref 0.19–0.46)
ALPHA2 GLOB SERPL ELPH-MCNC: 0.87 G/DL (ref 0.48–1.05)
B-GLOBULIN SERPL ELPH-MCNC: 0.79 G/DL (ref 0.68–1.23)
BASE EXCESS BLD CALC-SCNC: 8.2 MMOL/L (ref ?–2)
DSDNA AB TITR SER: <10 {TITER}
GAMMA GLOB SERPL ELPH-MCNC: 2.62 G/DL (ref 0.62–1.7)
HCO3 BLDA-SCNC: 31.2 MEQ/L (ref 21–27)
KAPPA LC FREE SER-MCNC: 1.53 MG/DL (ref 0.33–1.94)
KAPPA LC FREE/LAMBDA FREE SER NEPH: 1.27 {RATIO} (ref 0.26–1.65)
LAMBDA LC FREE SERPL-MCNC: 1.21 MG/DL (ref 0.57–2.63)
MAGNESIUM SERPL-MCNC: 1.7 MG/DL (ref 1.6–2.6)
MODIFIED ALLEN TEST: POSITIVE
O2 CT BLD-SCNC: 19 VOL% (ref 15–23)
PCO2 BLDA: 44 MM HG (ref 35–45)
PH BLDA: 7.48 [PH] (ref 7.35–7.45)
PO2 BLDA: 65 MM HG (ref 80–100)
PROT SERPL-MCNC: 8.1 G/DL (ref 6.4–8.2)
PUNCTURE CHARGE: YES
SAO2 % BLDA: 98.6 % (ref 94–100)
SARS-COV-2 RNA RESP QL NAA+PROBE: NOT DETECTED

## 2021-09-14 PROCEDURE — 70450 CT HEAD/BRAIN W/O DYE: CPT | Performed by: HOSPITALIST

## 2021-09-14 PROCEDURE — 99222 1ST HOSP IP/OBS MODERATE 55: CPT | Performed by: INTERNAL MEDICINE

## 2021-09-14 PROCEDURE — 76870 US EXAM SCROTUM: CPT | Performed by: OTHER

## 2021-09-14 PROCEDURE — 71045 X-RAY EXAM CHEST 1 VIEW: CPT | Performed by: HOSPITALIST

## 2021-09-14 PROCEDURE — 93975 VASCULAR STUDY: CPT | Performed by: OTHER

## 2021-09-14 PROCEDURE — 99232 SBSQ HOSP IP/OBS MODERATE 35: CPT | Performed by: HOSPITALIST

## 2021-09-14 RX ORDER — ACETAMINOPHEN 325 MG/1
650 TABLET ORAL EVERY 6 HOURS PRN
Refills: 0 | Status: SHIPPED | COMMUNITY
Start: 2021-09-14

## 2021-09-14 RX ORDER — LISINOPRIL 2.5 MG/1
2.5 TABLET ORAL DAILY
Refills: 0 | Status: SHIPPED | COMMUNITY
Start: 2021-09-15

## 2021-09-14 RX ORDER — MELATONIN
100 3 TIMES DAILY
Refills: 0 | Status: SHIPPED | COMMUNITY
Start: 2021-09-14

## 2021-09-14 RX ORDER — DIVALPROEX SODIUM 500 MG/1
500 TABLET, DELAYED RELEASE ORAL 2 TIMES DAILY
Refills: 0 | Status: SHIPPED | COMMUNITY
Start: 2021-09-14

## 2021-09-14 RX ORDER — FOLIC ACID 1 MG/1
1 TABLET ORAL DAILY
Refills: 0 | Status: SHIPPED | COMMUNITY
Start: 2021-09-15

## 2021-09-14 RX ORDER — DOXEPIN HYDROCHLORIDE 50 MG/1
1 CAPSULE ORAL DAILY
Refills: 0 | Status: SHIPPED | COMMUNITY
Start: 2021-09-15

## 2021-09-14 RX ORDER — GABAPENTIN 100 MG/1
100 CAPSULE ORAL 3 TIMES DAILY
Refills: 0 | Status: SHIPPED | COMMUNITY
Start: 2021-09-14

## 2021-09-14 RX ORDER — CHLORPROMAZINE HYDROCHLORIDE 50 MG/1
50 TABLET, FILM COATED ORAL 2 TIMES DAILY
Refills: 0 | Status: SHIPPED | COMMUNITY
Start: 2021-09-14

## 2021-09-14 RX ORDER — MAGNESIUM OXIDE 400 MG (241.3 MG MAGNESIUM) TABLET
400 TABLET ONCE
Status: COMPLETED | OUTPATIENT
Start: 2021-09-14 | End: 2021-09-14

## 2021-09-14 NOTE — CONSULTS
[facility]    Report of Consultation    Lincoln Rodriguez Sr. Patient Status:  Inpatient    1955 MRN [id number]   Location [facility] 5SW/SE Attending Rakesh Haney MD   Hosp Day # 16 PCP PHYSICIAN NONSTAFF     Consultation date No          Current medications reviewed.    acetaminophen (TYLENOL) tab 650 mg, 650 mg, Oral, Q6H PRN  gabapentin (NEURONTIN) cap 100 mg, 100 mg, Oral, TID  lisinopril tab 2.5 mg, 2.5 mg, Oral, Daily  Alum & Mag Hydroxide-Simeth (MAALOX) oral suspension 30 by mouth every 6 (six) hours as needed for Pain. Clopidogrel Bisulfate 75 MG Oral Tab, Take 75 mg by mouth daily. Atorvastatin Calcium 40 MG Oral Tab, Take 40 mg by mouth nightly.   Albuterol Sulfate  (90 BASE) MCG/ACT Inhalation Aero Soln, Inhale 09/08/2021    PTT 27.7 08/31/2021    INR 0.9 11/21/2016    PTP 11.8 11/21/2016    TSH 0.458 08/31/2021    DDIMER 0.40 01/01/2021    ESRML 121 (H) 09/12/2021    MG 1.7 09/14/2021    PHOS 3.2 09/08/2021    TROP <0.045 08/28/2021     11/18/2016    B12 1 diaphragm elevation. 2.  Post CABG. Dictated by (CST): Chiquis Delaney MD on 9/14/2021 at 10:09 AM     Finalized by (CST): Chiquis Delaney MD on 9/14/2021 at 10:10 AM                 Assessment/Plan:    This is a 58-year-old with multiple medical issues, admitt

## 2021-09-14 NOTE — PLAN OF CARE
Patient discharge held         Problem: Delirium  Goal: Minimize duration of delirium  Description: Interventions:  - Encourage use of hearing aids, eye glasses  - Promote highest level of mobility daily  - Provide frequent reorientation  - Promote wakeful Problem: METABOLIC/FLUID AND ELECTROLYTES - ADULT  Goal: Electrolytes maintained within normal limits  Description: INTERVENTIONS:  - Monitor labs and rhythm and assess patient for signs and symptoms of electrolyte imbalances  - Administer electrolyte re impaired and aphasic patients to use assistive/communication devices  9/14/2021 1519 by Bao Daniels RN  Outcome: Completed  9/14/2021 1120 by Bao Daniels RN  Outcome: Progressing     Problem: Patient Centered Care  Goal: Patient preferences are ident

## 2021-09-14 NOTE — PROGRESS NOTES
[place] [facility] - [facility]  Hospitalist Progress Note     Belinda Farias Sr. Patient Status:  Inpatient    1955  78year old CSN [id number]   Location 555/555-A Attending Curt Maxwell, [address] [facility] Se Day # 16 PCP PHYSICIAN NONSTAFF     Assessment & Plan: Patient presents with:  Altered Mental Status    ----------------------------------  Temp:  [97.5 °F (36.4 °C)-98 °F (36.7 °C)] 97.5 °F (36.4 °C)  Pulse:  [57-80] 80  Resp:  [16-20] 20  BP: ()/(9-72) 133/67  Gen: A+Ox2. No distress.    HEENT: NCAT, n 100 mg Oral TID   • OLANZapine (ZYPREXA) IM injection  5 mg Intramuscular Once   • cyanocobalamine  500 mcg Oral Daily   • ipratropium-albuterol  3 mL Nebulization 2 times daily   • multivitamin  1 tablet Oral Daily   • folic acid  1 mg Oral Daily   • asp

## 2021-09-14 NOTE — PLAN OF CARE
No acute events overnight. Calm and cooperative, Bipap on all night. Vitals stable.     Problem: Delirium  Goal: Minimize duration of delirium  Description: Interventions:  - Encourage use of hearing aids, eye glasses  - Promote highest level of mobility da imbalances  - Administer electrolyte replacement as ordered  - Monitor response to electrolyte replacements, including rhythm and repeat lab results as appropriate  - Fluid restriction as ordered  - Instruct patient on fluid and nutrition restrictions as a Encourage patient/family to participate in care and decision-making at the level they choose  - Honor patient and family perspectives and choices  Outcome: Progressing     Problem: Patient/Family Goals  Goal: Patient/Family Long Term Goal  Description: Adrian Holman

## 2021-09-14 NOTE — CM/SW NOTE
CM notified [facility] liaison of anticipated dc soon. PMR to re-eval 9/13.    9/14/21 1100  PMR rec remains acute, pt is reserved at [facility] pending medical clearance and available bed at [facility].    CM met w/ pt at bedside to update on above.  Pt vu.    1230  CM notified b

## 2021-09-14 NOTE — PROGRESS NOTES
Rehab Progress Note       SUBJECTIVE:    Interval History: Since last PM&R progress note 9/7, neurology continuing to follow him.   Patient has completed IVIG x5 doses with improvement supporting diagnosis of autoimmune encephalitis leading to rapidly progr clopidogrel  75 mg Oral Daily   • [Held by provider] isosorbide mononitrate ER  60 mg Oral Daily   • metoprolol succinate  50 mg Oral BID   • Heparin Sodium (Porcine)  5,000 Units Subcutaneous 2 times per day       OBJECTIVE:    Last Vitals:   09/13/21  17 present in this region. There may be superimposed pneumonia, potentially postobstructive, or there may be   sequela of aspiration pneumonitis.  Correlation with clinical parameters is advised.       2. A small right lower lobe nodule does not appear signifi outcomeRisks if patient is transferred to a lower level of care include: Delirium, aspiration, falls, arrhythmia, electrolyte abnormalities, depression  · Goal directed therapy with rehabilitation potential will be provided in the following domains.  Bed mo

## 2021-09-14 NOTE — PHYSICAL THERAPY NOTE
PHYSICAL THERAPY TREATMENT NOTE - INPATIENT     Room Number: 555/555-A       Presenting Problem: respiratory failure     Problem List  Principal Problem:    Acute on chronic respiratory failure with hypercapnia (HCC)  Active Problems:    Respiratory acidos documentation in the [facility] '6 clicks' Inpatient Basic Mobility Short Form. Research supports that patients with this level of impairment may benefit from acute rehab to optimize functional outcomes.     DISCHARGE RECOMMENDATIONS  PT Discharge Recommendatio Assistance: Moderate assistance; Minimum assistance  Distance (ft): 120ft 5 min rest break; 130ft. Chair follow  Assistive Device: Rolling walker  Pattern: Shuffle (flexed posture.  Decreased tarik speed. )  Stoop/Curb Assistance: Not tested  Comment : -

## 2021-09-14 NOTE — PLAN OF CARE
No acute changes during shift. Ultrasound completed today. Sister present at bedside. Patient has been cleared for discharge. Discharge pending bed availability at [facility]. Safety measures are in place. PT/OT worked with patient today.        Problem: Regina Gastelum Electrolytes maintained within normal limits  Description: INTERVENTIONS:  - Monitor labs and rhythm and assess patient for signs and symptoms of electrolyte imbalances  - Administer electrolyte replacement as ordered  - Monitor response to electrolyte rep information to patient/family  - Incorporate patient and family knowledge, values, beliefs, and cultural backgrounds into the planning and delivery of care  - Encourage patient/family to participate in care and decision-making at the level they choose  - H

## 2021-09-15 VITALS
WEIGHT: 208.13 LBS | DIASTOLIC BLOOD PRESSURE: 65 MMHG | TEMPERATURE: 98 F | OXYGEN SATURATION: 92 % | RESPIRATION RATE: 22 BRPM | BODY MASS INDEX: 30 KG/M2 | HEART RATE: 81 BPM | SYSTOLIC BLOOD PRESSURE: 107 MMHG

## 2021-09-15 LAB
ANA NUCLEOLAR TITR SER IF: 320 {TITER}
ARSENIC, BLOOD: <10 UG/L
CADMIUM, BLOOD: <1 UG/L
ENA SM IGG SER QL: NEGATIVE
ENA SS-B AB SER QL IA: NEGATIVE
LEAD, BLOOD (VENOUS): <2 UG/DL
MAGNESIUM SERPL-MCNC: 1.7 MG/DL (ref 1.6–2.6)
MERCURY, BLOOD: <2.5 UG/L

## 2021-09-15 PROCEDURE — 99233 SBSQ HOSP IP/OBS HIGH 50: CPT | Performed by: HOSPITALIST

## 2021-09-15 RX ORDER — MAGNESIUM OXIDE 400 MG (241.3 MG MAGNESIUM) TABLET
400 TABLET ONCE
Status: COMPLETED | OUTPATIENT
Start: 2021-09-15 | End: 2021-09-15

## 2021-09-15 NOTE — PLAN OF CARE
Problem: SAFETY ADULT - FALL  Goal: Free from fall injury  Description: INTERVENTIONS:  - Assess pt frequently for physical needs  - Identify cognitive and physical deficits and behaviors that affect risk of falls.   - [place] fall precautions as indica broncho-pulmonary hygiene including cough, deep breathe, Incentive Spirometry  - Assess the need for suctioning and perform as needed  - Assess and instruct to report SOB or any respiratory difficulty  - Respiratory Therapy support as indicated  - Manage/a activity/tolerance for mobility and gait  - Educate and engage patient/family in tolerated activity level and precautions    Outcome: Adequate for Discharge

## 2021-09-15 NOTE — PLAN OF CARE
Drowsy and confused, MD notified. Labs and CT brain done last night. On bipap until 0330 am. Continue to monitor.     Problem: SAFETY ADULT - FALL  Goal: Free from fall injury  Description: INTERVENTIONS:  - Assess pt frequently for physical needs  - Identi oxygen saturation or ABGs  - Provide Smoking Cessation handout, if applicable  - Encourage broncho-pulmonary hygiene including cough, deep breathe, Incentive Spirometry  - Assess the need for suctioning and perform as needed  - Assess and instruct to repor ability and stability  - Promote increasing activity/tolerance for mobility and gait  - Educate and engage patient/family in tolerated activity level and precautions    Outcome: Progressing

## 2021-09-15 NOTE — CM/SW NOTE
9/15 433pm: YANIRA spoke with the pt's wife Anamaria Manuel multiple times and discussed the options for the pt. Regarding the [facility].   YANIRA provided a range of possible co-pay costs to the pt's wife after speaking with Maria L Raya, but MJ also sent the pt's wif center. Both of which has been started. Once additional information is received from both SANCHEZ and the [address], YANIRA will contact the pt's wife with her options. Per the pt's wife she wants the best care for the pt.     YANIRA will continue to follow and assist.

## 2021-09-15 NOTE — DISCHARGE SUMMARY
[facility]    Discharge Summary    Cash Guerra Sr. Patient Status:  Inpatient    1955 MRN [id number]   Location [facility] 5SW/SE Attending Sravani Vallecillo MD   Hosp Day # 25 PCP PHYSICIAN NONSTAFF     Date of Admission: 8 He was in his usual state of health until the last few weeks when he has had worsening lethargy. His spouse and he attended a memorial service this morning but the patient was falling asleep at the table.   He carries the diagnosis of obstructive sleep ap temperature 97.6 °F (36.4 °C), temperature source Oral, resp. rate 22, weight 208 lb 1.8 oz (94.4 kg), SpO2 92 %. Code: Full    Diet: Cardiac, no straw, thin liquids    Activity: Up as tolerated    Follow up:     Follow-up Information     Jamaal Jiménez B-1      Take 1 tablet (100 mg total) by mouth in the morning, at noon, and at bedtime.    Refills: 0        CHANGE how you take these medications      Instructions Prescription details   lisinopril 2.5 MG Tabs  What changed:   · medication strength  · how [address], [address] [facility] 1850 [facility] [place], [address] Phone: [phone]  [address]   [address]  [facility], [address] Phone: [phone]  Eric Ortega, [facility], ATR-BC of Carlo coordination of this discharge      Attending addendum:  Pt seen and examined. Agree with above. Pt oriented to place but not year. Mental status stable to improved. Ok for [employer] per neurology and oncology. Pt noted to have elevated SANTOSH screen.   SANTOSH

## 2021-09-15 NOTE — OCCUPATIONAL THERAPY NOTE
OCCUPATIONAL THERAPY TREATMENT NOTE - INPATIENT        Room Number: 555/555-A    Presenting Problem: Admit 8/28 d/t  acute on chronic respiratory failure    Problem List  Principal Problem:    Acute on chronic respiratory failure with hypercapnia ([facility] [address]  Ac ASSESSMENT  Ratin  Location:  (generalized R knee)  Management Techniques:  Activity promotion; Repositioning     ACTIVITIES OF DAILY LIVING ASSESSMENT  AM-PAC ‘6-Clicks’ Inpatient Daily Activity Short Form  How much help from another person does the pa

## 2021-09-16 LAB
ACETYLCHOLINE BINDING AB: 0 NMOL/L
GLUTAMIC ACID DECARBOXYLASE AB: 100.4 IU/ML
MYELOPEROX ANTIBODIES, IGG: 2 AU/ML
NEURONAL ANTIBODY (AMPHIPHYSIN): NEGATIVE
SERINE PROTEASE3, IGG: 3 AU/ML
SOX1 ANTIBODY, IGG BY IMMUNOBLOT, SERUM: NEGATIVE
VITAMIN B1 (THIAMINE), WHOLE B: 154 NMOL/L
VOLTAGE-GATED POTASSIUM CHANNEL: 18 PMOL/L

## 2021-09-19 ENCOUNTER — TELEPHONE (OUTPATIENT)
Dept: NEUROLOGY | Facility: CLINIC | Age: 66
End: 2021-09-19

## 2021-09-19 NOTE — TELEPHONE ENCOUNTER
Hello,    This is a patient of Dr. Jeff Curry (saw as outpatient in 2017 and he was the initial neurologist following during the patient's recent hospital admission 8/31/2021) for autoimmune encephalitis.  I ordered a serum blood test duribg the admission (au

## 2021-09-20 NOTE — TELEPHONE ENCOUNTER
Attempted to call pt. Left un-detailed message to call office back. Upon return call pt needs to be scheduled with Dr Abimbola Turner within next few weeks as noted below.

## 2021-09-20 NOTE — TELEPHONE ENCOUNTER
Called & spoke to pt wife to notify of below.  Pt spouse states pt is currently hospitalized & in restraints at [facility]. Pt spouse requesting Dr [place] Barton call her ASAP to discuss the result & what to do next because the patient cannot come into see Dr Kathe Rose

## 2021-09-20 NOTE — TELEPHONE ENCOUNTER
Spoke to wife, relayed information from Dr Estevez Lower note 9/20/21. Patient`s wife has office phone # for providers at [facility] to call to speak to Dr Angela Pritchett directly. States Dr Irma Tamayo is caring for him at [facility]. States she will have provider call Dr Angela Pritchett.   Karthik Ricci

## 2021-09-20 NOTE — TELEPHONE ENCOUNTER
Please call the wife and tell her that I just reviewed all the testing which was done at [facility] which did not provide etiology or cause for the neurological deterioration in her .   She is presently at another hospital please find out the

## 2021-09-23 ENCOUNTER — TELEPHONE (OUTPATIENT)
Dept: NEUROLOGY | Facility: CLINIC | Age: 66
End: 2021-09-23

## 2021-09-23 LAB
ACETYLCHOLINE BINDING AB: 0.2 NMOL/L
GLUTAMIC ACID DECARBOXYLASE AB: >250 IU/ML
NEURONAL ANTIBODY (AMPHIPHYSIN): NEGATIVE
SOX1 ANTIBODY, IGG BY IMMUNOBLOT, SERUM: NEGATIVE
VOLTAGE-GATED POTASSIUM CHANNEL: 65 PMOL/L

## 2021-09-23 NOTE — TELEPHONE ENCOUNTER
----- Message from Kane Beth MD sent at 9/23/2021  2:42 PM CDT -----  Please let the patient know that ultimately work-up did show some abnormal antibodies, that confirms the diagnosis of autoimmune encephalitis most likely.     I believe patient

## 2021-09-23 NOTE — TELEPHONE ENCOUNTER
Called and spoke to pt wife. Wife was informed about below notes. Pt is currently at [facility]. Pt wife state she will notify doctor over seeing care to look at abnormal lab work.

## 2022-09-20 NOTE — PLAN OF CARE
Anesthesia Post Evaluation    Patient: Patsy Ozuna    Procedure(s) Performed: Procedure(s) (LRB):  COLONOSCOPY (N/A)    Final Anesthesia Type: general      Patient location during evaluation: PACU  Patient participation: Yes- Able to Participate  Level of consciousness: awake and alert and oriented  Post-procedure vital signs: reviewed and stable  Pain management: adequate  Airway patency: patent    PONV status at discharge: No PONV  Anesthetic complications: no      Cardiovascular status: blood pressure returned to baseline, stable and hemodynamically stable  Respiratory status: unassisted  Hydration status: euvolemic  Follow-up not needed.          Vitals Value Taken Time   /71 09/20/22 0827   Temp 36.1 °C (97 °F) 09/20/22 0825   Pulse 72 09/20/22 0831   Resp 31 09/20/22 0831   SpO2 99 % 09/20/22 0831   Vitals shown include unvalidated device data.      No case tracking events are documented in the log.      Pain/Jose Score: Jose Score: 9 (9/20/2022  8:27 AM)         Problem: Delirium  Goal: Minimize duration of delirium  Description: Interventions:  - Encourage use of hearing aids, eye glasses  - Promote highest level of mobility daily  - Provide frequent reorientation  - Promote wakefulness i.e. lights on, blinds o electrolyte replacements, including rhythm and repeat lab results as appropriate  - Fluid restriction as ordered  - Instruct patient on fluid and nutrition restrictions as appropriate  Outcome: Progressing     Problem: SKIN/TISSUE INTEGRITY - ADULT  Goal: choose  - Honor patient and family perspectives and choices  Outcome: Progressing     Problem: Patient/Family Goals  Goal: Patient/Family Long Term Goal  Description: Patient's Long Term Goal: Be discharged from hospital.    Interventions:  - Follow MD ord

## (undated) NOTE — MR AVS SNAPSHOT
[facility]  [address], [address] [facility]  [address] [facility] [phone]               Thank you for choosing us for your health care visit with Darell Flynn MD, MD.  We are glad to serve you and happy to provide you with this summary of methylPREDNISolone 4 MG Tbpk   As directed   Commonly known as:  MEDROL           Metoprolol Succinate ER 50 MG Tb24   Take 50 mg by mouth 2 (two) times daily. Commonly known as:   Toprol XL                Where to Get Your Medications      These medicat

## (undated) NOTE — MR AVS SNAPSHOT
[facility]  [address], [address] [facility]  [address] [facility] [phone]               Thank you for choosing us for your health care visit with Odilon Yap MD, MD.  We are glad to serve you and happy to provide you with this summary of HYDROcodone-acetaminophen  MG Tabs   Take 1 tablet by mouth every 6 (six) hours as needed for Pain. What changed:  Another medication with the same name was removed. Continue taking this medication, and follow the directions you see here.    Com schedule your appointment. Failure to obtain required authorization numbers can create reimbursement difficulties for you.         Dx of L C-7 radiculopathy-please continue with PT 2/week for 6-8 weeks,include traction    Functional Status questions complet Choose whole grain products Foods high in sodium   Water is best for hydration Fast food.    Eat at home when possible     Tips for increasing your physical activity – Adults who are physically active are less likely to develop some chronic diseases than ad

## (undated) NOTE — LETTER
[address], [place]  Authorization for Invasive Procedures  1.  I hereby authorize Dr. Shanita Castellon , my physician and whomever may be designated as the doctor's assistant, to perform the following operation and/or procedure: Lumbar and allergic reactions, hemolytic reactions, transmission of disease such as hepatitis, AIDS, cytomegalovirus (CMV), and flluid overload.  In the event that I wish to have autologous transfusions of my own blood, or a directed donor transfusion, I will disc Signature of Patient:  ________________________________________________ Date: _________Time: _________    Responsible person in case of minor or unconscious: _____________________________Relationship: ____________     Witness Signature: ___________________

## (undated) NOTE — Clinical Note
[address] [facility] [place]  [address], [address] [place] [address]  [address]  [phone]        Dear Juju Greer,      I had the pleasure of seeing your patient, Daniel Hurst. on 3/7/2017.      Below please find a summary of our v

## (undated) NOTE — IP AVS SNAPSHOT
[facility]            (For Outpatient Use Only) Initial Admit Date: 8/28/2021   Inpt/Obs Admit Date: Inpt: 8/28/21 / Obs: N/A   Discharge Date:    Martín Booth:  [de-identified]   MRN: [de-identified]   CSN: [id number]   CEID: [id number]        [facility] Number:  Insurance Type:    Subscriber Name:  Subscriber :    Subscriber ID:  Pt Rel to Subscriber:    Hospital Account Financial Class: Medicare    September 15, 2021

## (undated) NOTE — IP AVS SNAPSHOT
Patient Demographics     Address  [address] [place] Phone  [phone] (Home) *Preferred*  [phone] [facility])      Emergency Contact(s)     Name Relation Home Work Mobile    Ana Velasquez Spouse   [phone]      Allergies as of 9/15/ [address], LCSW of [facility] and [phone] [facility]. [address] [place], [address] Phone: [phone]    Job Hodges [facility] (private [phone] [facility], [address] [place] [address]. [facility] Phone: [phone]    Ps atorvastatin 40 MG Tabs  Commonly known as: LIPITOR  Next dose due: Tonight      Take 40 mg by mouth nightly. chlorproMAZINE 50 MG Tabs  Commonly known as: THORAZINE  Next dose due:  Tonight      Take 1 tablet (50 mg total) by mouth 2 (two) times Comments    [id number] acetaminophen (TYLENOL) tab 650 mg 09/15/21 0916 Given      [id number] aspirin chewable tab 81 mg 09/15/21 0908 Given      [id number] atorvastatin (LIPITOR) tab 40 mg 09/14/21 2159 Given      [id number] chlorproMAZINE (THORAZINE) tab 50 Filed at 09/15/2021 0903   SpO2 92 % Filed at 09/15/2021 1052      Patient's Most Recent Weight       Most Recent Value   Patient Weight 94.4 kg (208 lb 1.8 oz)      CPAP Settings (Inpatient)       Most Recent Value   Mode Spontaneous/Timed   Interface Ful contamination, including the use of a noncertified metal-free   collection/transport tube.  If contamination concerns exist due to   elevated levels of blood arsenic, confirmation with a second   specimen collected in a certified metal-free tube is recomm cleared   or   approved by the [employer] and Drug Administration. This test was   performed in a CLIA certified laboratory and is intended for   clinical purposes.    Lead, Blood (Venous) <2.0 <=4.9 ug/dL — [facility]   Comment:        INTERPRETIVE INFORMATION: 20-69.9 ug/dL  Removal from lead exposure                                 and prompt medical                                 evaluation are recommended.                                  Consider chelation therapy                                 when nick exceed 20 ug/L. The provided reference interval relates to   inorganic mercury concentrations. Dietary and non-occupational   exposure to organic mercury forms may contribute to an elevated   total mercury result.  Clinical presentation after toxic exposure [facility]   ABG O2 Saturation 98.6 94.0 - 100.0 % — [facility]   Oxygen Content 19.0 15.0 - 23.0 Vol% — [facility])   Oxygen Delivery Device Room Air — — [facility] ( fluid from CSF, lumbar puncture      CSF Culture Result No Growth 3 Days     CSF Smear No organisms seen      No WBCs seen      This is a cytocentrifuged smear.     AFB Culture and Smear [[id number]] Collected: 09/07/21 1029    Order Status: Sent Lab Status: Detected      Pending Labs     Order Current Status    Anca Panel Vasculitis W/Reflex In process    Anti-extractable Nuclear Ag In process    Autoimmune Neurologic Disease Reflexive Panel In process    Autoimmune Neurologic Disease Reflexive Panel In proce arousable and cogent now on the BiPAP.     History     Past Medical History:   Diagnosis Date   • Arthritis    • Atherosclerosis of coronary artery    • Essential hypertension    • Heart attack [facility])    • Hyperlipidemia    • Myocardial infarction ([facility] [address] diminished breath sounds with subtle central expiratory wheeze to auscultation and percussion. Cardiac regular rate and rhythm no murmur. Abdomen nontender, without hepatosplenomegaly and no mass appreciable.    Extremities without clubbing cyanosis nor with the patient when he rouses. May benefit from psychiatry evaluation. 5.  Arthritis–Tylenol as needed for now and will avoid Norco.    I am delighted to assist with Richardson's care.     Greater than 35 minutes critical care time      Zahida Card MD Atherosclerosis of coronary artery    • Essential hypertension    • Heart attack [facility])    • Hyperlipidemia    • Myocardial infarction [facility])     January 2017, treated at [facility].    • Reactive airway disease    • Sleep apnea        Past Surgical Histor Daily  atorvastatin (LIPITOR) tab 40 mg, 40 mg, Oral, Nightly  clopidogrel (PLAVIX) tab 75 mg, 75 mg, Oral, Daily  [Held by provider] isosorbide mononitrate ER (IMDUR) 24 hr tab 60 mg, 60 mg, Oral, Daily  metoprolol succinate (Toprol XL) 24 hr tab 50 mg, 5 today.  HEENT: Moist mucous membranes. Neck: No axillary cervical or supraclavicular adenopathy  Respiratory: Symmetric expansion, non-labored breathing. Abdomen: Obese soft no obvious distention  Neurologic: No focal neurological deficits.   He has slow Mild ascending thoracic aortic ectasia. 7. Lesser incidental findings as above.    elm-remote.    Dictated by (CST): Shawn Sanchez MD on 9/13/2021 at 7:18 AM     Finalized by (CST): Shawn Sanchez MD on 9/13/2021 at 7:37 AM          US SCROTUM W/ DOPPLE appears to be chronic but will obtain an AFP baseline. His lung findings to have confluent airspace disease appears to be atelectasis but this can be followed as well. Otherwise ultrasound testicular exam was negative.       Okay to discharge patient to r Rapidly progressive dementia ([facility] [address]     Thrombocytopenia ([facility] [address]    Physical Exam:     Gen: A+Ox2. No distress. HEENT: NCAT, neck supple, no carotid bruit. CV: RRR, S1S2, and intact distal pulses. No gallop, rub, murmur.   Pulm: Effort and breath sounds no abnormal, but has not been shown to be malignant. This will not be completely evaluated inpatient as he will likely need a PET scan or bronchoscopy for complete evaluation. Oncology was consulted by neurology.   No further inpatient work-up suggested to e Discharge Medications      START taking these medications      Instructions Prescription details   acetaminophen 325 MG Tabs  Commonly known as: TYLENOL      Take 2 tablets (650 mg total) by mouth every 6 (six) hours as needed.    Refills: 0     chlorproM 2 (two) times daily.    Refills: 0        STOP taking these medications    Albuterol Sulfate  (90 Base) MCG/ACT Aers        amLODIPine 5 MG Tabs  Commonly known as: NORVASC        cyclobenzaprine 10 MG Tabs  Commonly known as: FLEXERIL        [employer]  [address] [facility] [address] [place], [address] Phone: [phone]    Dedrick Jerome [facility] (private practice)  [address] [facility] 189 [facility], [address] [place] [address] Phone: [phone]    Psychiatrists in network with Medicare:  1.  [address] 555/555-A       Presenting Problem: respiratory failure     Problem List  Principal Problem:    Acute on chronic respiratory failure with hypercapnia (HCC)  Active Problems:    Respiratory acidosis    Delirium due to multiple etiologies    Metabolic enceph Mobility Short Form. Research supports that patients with this level of impairment may benefit from acute rehab to optimize functional outcomes.     DISCHARGE RECOMMENDATIONS  PT Discharge Recommendations: Acute rehabilitation     PLAN  PT Treatment Plan: (ft): 120ft 5 min rest break; 130ft. Chair follow  Assistive Device: Rolling walker  Pattern: Shuffle (flexed posture.  Decreased tarik speed. )  Stoop/Curb Assistance: Not tested  Comment : -    THERAPEUTIC EXERCISES  Lower Extremity Alternating marching Occupational Therapy Notes (last 72 hours)      Occupational Therapy Note signed by Julio Cesar Bruno OT at 9/15/2021 12:01 PM  Version 1 of 1    Author: Julio Cesar Bruno OT Service: Rehab Author Type: Occupational Therapist    Filed: 9/15/2021 12:01 PM Date Acute rehabilitation  OT Device Recommendations: Reacher; Grab bars; Shower chair     PLAN  OT Treatment Plan: Balance activities; ADL training; Functional transfer training;  Endurance training    SUBJECTIVE  'Be safe out there'    OBJECTIVE  Precautions: cues needed for RW placement and direction     Patient will complete LB dressing with spv  Comment: Progressing -min.  A for pull up . Increased time for task            Goals  on:  (goals initially  9/10 - will continue with current POC, n swallowing intervention secondary to functional swallowing skills, family education completed and goal achievement. Diet Recommendations - Solids: Regular  Diet Recommendations - Liquids:  Thin Liquids    Compensatory Strategies Recommended: No straws;S have any questions, please contact   Stefanie Krabbe, M.S. OSEI/SLP  Speech-Language Pathologist  [facility]  #[id number]        Electronically signed by JB Maynard on 8/31/2021  2:43 PM           Multidisciplinary Problems     A